# Patient Record
Sex: FEMALE | Race: WHITE | NOT HISPANIC OR LATINO | Employment: OTHER | URBAN - METROPOLITAN AREA
[De-identification: names, ages, dates, MRNs, and addresses within clinical notes are randomized per-mention and may not be internally consistent; named-entity substitution may affect disease eponyms.]

---

## 2023-05-25 LAB
BASOPHILS (10*3/UL) IN BLOOD BY AUTOMATED COUNT: 0.05 X10E9/L (ref 0–0.1)
BASOPHILS/100 LEUKOCYTES IN BLOOD BY AUTOMATED COUNT: 0.4 % (ref 0–2)
EOSINOPHILS (10*3/UL) IN BLOOD BY AUTOMATED COUNT: 0.36 X10E9/L (ref 0–0.7)
EOSINOPHILS/100 LEUKOCYTES IN BLOOD BY AUTOMATED COUNT: 3.2 % (ref 0–6)
ERYTHROCYTE DISTRIBUTION WIDTH (RATIO) BY AUTOMATED COUNT: 18.4 % (ref 11.5–14.5)
ERYTHROCYTE MEAN CORPUSCULAR HEMOGLOBIN CONCENTRATION (G/DL) BY AUTOMATED: 29 G/DL (ref 32–36)
ERYTHROCYTE MEAN CORPUSCULAR VOLUME (FL) BY AUTOMATED COUNT: 88 FL (ref 80–100)
ERYTHROCYTES (10*6/UL) IN BLOOD BY AUTOMATED COUNT: 3.39 X10E12/L (ref 4–5.2)
HEMATOCRIT (%) IN BLOOD BY AUTOMATED COUNT: 29.7 % (ref 36–46)
HEMOGLOBIN (G/DL) IN BLOOD: 8.6 G/DL (ref 12–16)
IMMATURE GRANULOCYTES/100 LEUKOCYTES IN BLOOD BY AUTOMATED COUNT: 0.7 % (ref 0–0.9)
LEUKOCYTES (10*3/UL) IN BLOOD BY AUTOMATED COUNT: 11.3 X10E9/L (ref 4.4–11.3)
LYMPHOCYTES (10*3/UL) IN BLOOD BY AUTOMATED COUNT: 1.49 X10E9/L (ref 1.2–4.8)
LYMPHOCYTES/100 LEUKOCYTES IN BLOOD BY AUTOMATED COUNT: 13.2 % (ref 13–44)
MONOCYTES (10*3/UL) IN BLOOD BY AUTOMATED COUNT: 0.88 X10E9/L (ref 0.1–1)
MONOCYTES/100 LEUKOCYTES IN BLOOD BY AUTOMATED COUNT: 7.8 % (ref 2–10)
NEUTROPHILS (10*3/UL) IN BLOOD BY AUTOMATED COUNT: 8.43 X10E9/L (ref 1.2–7.7)
NEUTROPHILS/100 LEUKOCYTES IN BLOOD BY AUTOMATED COUNT: 74.7 % (ref 40–80)
PLATELETS (10*3/UL) IN BLOOD AUTOMATED COUNT: 461 X10E9/L (ref 150–450)

## 2023-07-26 ENCOUNTER — TELEPHONE (OUTPATIENT)
Dept: PRIMARY CARE | Facility: CLINIC | Age: 68
End: 2023-07-26
Payer: MEDICARE

## 2023-07-26 NOTE — TELEPHONE ENCOUNTER
Pt is due for her mammogram. According to her chart, she has never had one.    Please let me know when this is ordered so I can call pt.

## 2023-08-12 NOTE — TELEPHONE ENCOUNTER
I have not seen her in > 1 year and she missed her last appointment at our office. Please call to schedule with me.

## 2023-09-01 PROBLEM — E86.0 DEHYDRATION: Status: ACTIVE | Noted: 2023-09-01

## 2023-09-01 PROBLEM — F33.41 RECURRENT MAJOR DEPRESSIVE DISORDER, IN PARTIAL REMISSION (CMS-HCC): Status: ACTIVE | Noted: 2022-06-28

## 2023-09-01 PROBLEM — G43.119 INTRACTABLE MIGRAINE WITH AURA WITHOUT STATUS MIGRAINOSUS: Status: ACTIVE | Noted: 2023-04-04

## 2023-09-01 PROBLEM — R06.02 SOB (SHORTNESS OF BREATH) ON EXERTION: Status: ACTIVE | Noted: 2023-07-17

## 2023-09-01 PROBLEM — R29.6 FREQUENT FALLS: Status: ACTIVE | Noted: 2023-09-01

## 2023-09-01 PROBLEM — I49.01 VENTRICULAR FIBRILLATION (MULTI): Status: ACTIVE | Noted: 2023-09-01

## 2023-09-01 PROBLEM — G47.33 OSA ON CPAP: Status: ACTIVE | Noted: 2023-09-01

## 2023-09-01 PROBLEM — I50.32 CHRONIC DIASTOLIC CONGESTIVE HEART FAILURE (MULTI): Status: ACTIVE | Noted: 2022-06-29

## 2023-09-01 PROBLEM — R10.10 PAIN OF UPPER ABDOMEN: Status: ACTIVE | Noted: 2023-09-01

## 2023-09-01 PROBLEM — F41.0 PANIC DISORDER WITHOUT AGORAPHOBIA: Status: ACTIVE | Noted: 2020-01-29

## 2023-09-01 PROBLEM — K21.9 GASTROESOPHAGEAL REFLUX DISEASE WITHOUT ESOPHAGITIS: Status: ACTIVE | Noted: 2022-06-28

## 2023-09-01 PROBLEM — R93.2 ABNORMAL LIVER ULTRASOUND: Status: ACTIVE | Noted: 2023-09-01

## 2023-09-01 PROBLEM — I25.10 ATHEROSCLEROSIS OF NATIVE CORONARY ARTERY OF NATIVE HEART WITHOUT ANGINA PECTORIS: Status: ACTIVE | Noted: 2023-09-01

## 2023-09-01 PROBLEM — Z95.0 PACEMAKER: Status: ACTIVE | Noted: 2023-09-01

## 2023-09-01 PROBLEM — E66.813 OBESITY, CLASS III, BMI 40-49.9 (MORBID OBESITY): Status: ACTIVE | Noted: 2023-03-27

## 2023-09-01 PROBLEM — R44.2: Status: ACTIVE | Noted: 2023-09-01

## 2023-09-01 PROBLEM — E78.2 HYPERLIPIDEMIA, MIXED: Status: ACTIVE | Noted: 2023-09-01

## 2023-09-01 PROBLEM — E78.5 DYSLIPIDEMIA: Status: ACTIVE | Noted: 2022-06-28

## 2023-09-01 PROBLEM — R11.0 NAUSEA IN ADULT: Status: ACTIVE | Noted: 2023-09-01

## 2023-09-01 PROBLEM — I50.9 CONGESTIVE HEART FAILURE OF UNKNOWN ETIOLOGY (MULTI): Status: ACTIVE | Noted: 2023-09-01

## 2023-09-01 PROBLEM — S92.355D CLOSED NONDISPLACED FRACTURE OF FIFTH METATARSAL BONE OF LEFT FOOT WITH ROUTINE HEALING: Status: ACTIVE | Noted: 2020-09-17

## 2023-09-01 PROBLEM — F31.81 BIPOLAR II DISORDER (MULTI): Status: ACTIVE | Noted: 2021-07-16

## 2023-09-01 PROBLEM — R11.2 NAUSEA VOMITING AND DIARRHEA: Status: ACTIVE | Noted: 2023-07-18

## 2023-09-01 PROBLEM — J96.11 CHRONIC RESPIRATORY FAILURE WITH HYPOXIA (MULTI): Status: ACTIVE | Noted: 2018-10-15

## 2023-09-01 PROBLEM — M54.9 BACK PAIN: Status: ACTIVE | Noted: 2023-09-01

## 2023-09-01 PROBLEM — E87.6 HYPOKALEMIA: Status: ACTIVE | Noted: 2022-10-05

## 2023-09-01 PROBLEM — D50.9 NORMOCYTIC HYPOCHROMIC ANEMIA: Status: ACTIVE | Noted: 2023-01-27

## 2023-09-01 PROBLEM — G62.9 NEUROPATHY: Status: ACTIVE | Noted: 2023-09-01

## 2023-09-01 PROBLEM — Z98.890 H/O CARDIAC CATHETERIZATION: Status: ACTIVE | Noted: 2023-09-01

## 2023-09-01 PROBLEM — R91.1 PULMONARY NODULE SEEN ON IMAGING STUDY: Status: ACTIVE | Noted: 2023-09-01

## 2023-09-01 PROBLEM — I47.10 PSVT (PAROXYSMAL SUPRAVENTRICULAR TACHYCARDIA) (CMS-HCC): Status: ACTIVE | Noted: 2023-09-01

## 2023-09-01 PROBLEM — Z95.810 CARDIAC DEFIBRILLATOR IN PLACE: Status: ACTIVE | Noted: 2023-09-01

## 2023-09-01 PROBLEM — E87.70 FLUID OVERLOAD: Status: ACTIVE | Noted: 2023-09-01

## 2023-09-01 PROBLEM — E66.01 OBESITY, CLASS III, BMI 40-49.9 (MORBID OBESITY) (MULTI): Status: ACTIVE | Noted: 2023-03-27

## 2023-09-01 PROBLEM — K22.70 BARRETT'S ESOPHAGUS WITHOUT DYSPLASIA: Status: ACTIVE | Noted: 2023-09-01

## 2023-09-01 PROBLEM — E66.9 CLASS 2 OBESITY WITH BODY MASS INDEX (BMI) OF 38.0 TO 38.9 IN ADULT: Status: ACTIVE | Noted: 2023-09-01

## 2023-09-01 PROBLEM — F33.1 MDD (MAJOR DEPRESSIVE DISORDER), RECURRENT EPISODE, MODERATE (MULTI): Status: ACTIVE | Noted: 2020-01-29

## 2023-09-01 PROBLEM — J44.9 STAGE 3 SEVERE COPD BY GOLD CLASSIFICATION (MULTI): Status: ACTIVE | Noted: 2018-10-15

## 2023-09-01 PROBLEM — G25.81 RESTLESS LEGS: Status: ACTIVE | Noted: 2023-09-01

## 2023-09-01 PROBLEM — R55 NEAR SYNCOPE: Status: ACTIVE | Noted: 2023-09-01

## 2023-09-01 PROBLEM — E66.9 CLASS 1 OBESITY: Status: ACTIVE | Noted: 2020-09-10

## 2023-09-01 PROBLEM — E87.1 HYPONATREMIA: Status: ACTIVE | Noted: 2023-09-01

## 2023-09-01 PROBLEM — K43.2 INCISIONAL HERNIA: Status: ACTIVE | Noted: 2023-09-01

## 2023-09-01 PROBLEM — R51.9 HEADACHE: Status: ACTIVE | Noted: 2023-09-01

## 2023-09-01 PROBLEM — E66.9 OBESITY (BMI 30.0-34.9): Status: ACTIVE | Noted: 2023-09-01

## 2023-09-01 PROBLEM — R42 DIZZINESS: Status: ACTIVE | Noted: 2023-09-01

## 2023-09-01 PROBLEM — E66.811 CLASS 1 OBESITY: Status: ACTIVE | Noted: 2020-09-10

## 2023-09-01 PROBLEM — K56.51 INTESTINAL ADHESIONS WITH PARTIAL OBSTRUCTION (MULTI): Status: ACTIVE | Noted: 2023-09-01

## 2023-09-01 PROBLEM — E66.812 CLASS 2 OBESITY WITH BODY MASS INDEX (BMI) OF 36.0 TO 36.9 IN ADULT: Status: ACTIVE | Noted: 2023-09-01

## 2023-09-01 PROBLEM — I48.0 PAROXYSMAL ATRIAL FIBRILLATION WITH RVR (MULTI): Status: ACTIVE | Noted: 2023-09-01

## 2023-09-01 PROBLEM — J18.9 PNEUMONIA DUE TO INFECTIOUS ORGANISM: Status: ACTIVE | Noted: 2022-10-15

## 2023-09-01 PROBLEM — R53.83 FATIGUE: Status: ACTIVE | Noted: 2023-09-01

## 2023-09-01 PROBLEM — R29.818 SUSPECTED SLEEP APNEA: Status: ACTIVE | Noted: 2023-09-01

## 2023-09-01 PROBLEM — R10.11 COLICKY RUQ ABDOMINAL PAIN: Status: ACTIVE | Noted: 2023-09-01

## 2023-09-01 PROBLEM — J45.909 INTRINSIC ASTHMA (HHS-HCC): Status: ACTIVE | Noted: 2023-09-01

## 2023-09-01 PROBLEM — E66.812 CLASS 2 OBESITY WITH BODY MASS INDEX (BMI) OF 38.0 TO 38.9 IN ADULT: Status: ACTIVE | Noted: 2023-09-01

## 2023-09-01 PROBLEM — K59.09 CHRONIC CONSTIPATION: Status: ACTIVE | Noted: 2022-06-28

## 2023-09-01 PROBLEM — I47.29 PAROXYSMAL VENTRICULAR TACHYCARDIA (MULTI): Status: ACTIVE | Noted: 2023-09-01

## 2023-09-01 PROBLEM — I95.1 ORTHOSTATIC HYPOTENSION: Status: ACTIVE | Noted: 2023-09-01

## 2023-09-01 PROBLEM — A49.8 KLEBSIELLA INFECTION: Status: ACTIVE | Noted: 2023-09-01

## 2023-09-01 PROBLEM — I47.20 PAROXYSMAL VENTRICULAR TACHYCARDIA: Status: ACTIVE | Noted: 2023-09-01

## 2023-09-01 PROBLEM — S62.308A: Status: ACTIVE | Noted: 2023-09-01

## 2023-09-01 PROBLEM — E66.811 OBESITY (BMI 30.0-34.9): Status: ACTIVE | Noted: 2023-09-01

## 2023-09-01 PROBLEM — J44.1 ACUTE EXACERBATION OF CHRONIC OBSTRUCTIVE PULMONARY DISEASE (MULTI): Status: ACTIVE | Noted: 2022-04-20

## 2023-09-01 PROBLEM — M19.90 DJD (DEGENERATIVE JOINT DISEASE): Status: ACTIVE | Noted: 2023-09-01

## 2023-09-01 PROBLEM — R19.7 NAUSEA VOMITING AND DIARRHEA: Status: ACTIVE | Noted: 2023-07-18

## 2023-09-01 PROBLEM — E66.9 CLASS 2 OBESITY WITH BODY MASS INDEX (BMI) OF 36.0 TO 36.9 IN ADULT: Status: ACTIVE | Noted: 2023-09-01

## 2023-09-01 PROBLEM — K56.609 SMALL BOWEL OBSTRUCTION (MULTI): Status: ACTIVE | Noted: 2023-09-01

## 2023-09-01 PROBLEM — Z79.899 LONG TERM USE OF DRUG: Status: ACTIVE | Noted: 2023-09-01

## 2023-09-01 RX ORDER — LEVOTHYROXINE SODIUM 112 UG/1
112 TABLET ORAL
COMMUNITY
Start: 2018-03-28

## 2023-09-01 RX ORDER — VALSARTAN 160 MG/1
160 TABLET ORAL DAILY
COMMUNITY
End: 2023-11-10 | Stop reason: ALTCHOICE

## 2023-09-01 RX ORDER — METOPROLOL SUCCINATE 25 MG/1
25 TABLET, EXTENDED RELEASE ORAL EVERY EVENING
COMMUNITY
Start: 2022-08-15 | End: 2023-11-10 | Stop reason: SDUPTHER

## 2023-09-01 RX ORDER — ALBUTEROL SULFATE 0.83 MG/ML
2.5 SOLUTION RESPIRATORY (INHALATION) EVERY 4 HOURS PRN
COMMUNITY
Start: 2013-02-06

## 2023-09-01 RX ORDER — FLUOXETINE HYDROCHLORIDE 20 MG/1
40 CAPSULE ORAL DAILY
COMMUNITY
Start: 2014-02-13 | End: 2023-11-10 | Stop reason: ALTCHOICE

## 2023-09-01 RX ORDER — ASPIRIN 81 MG/1
81 TABLET ORAL DAILY
COMMUNITY
End: 2023-11-10 | Stop reason: ALTCHOICE

## 2023-09-01 RX ORDER — TIOTROPIUM BROMIDE 18 UG/1
18 CAPSULE ORAL; RESPIRATORY (INHALATION) DAILY
COMMUNITY
Start: 2013-11-18 | End: 2023-11-10 | Stop reason: ALTCHOICE

## 2023-09-01 RX ORDER — MONTELUKAST SODIUM 10 MG/1
10 TABLET ORAL NIGHTLY
COMMUNITY
End: 2023-11-10 | Stop reason: ALTCHOICE

## 2023-09-01 RX ORDER — FERROUS SULFATE 325(65) MG
325 TABLET ORAL DAILY
COMMUNITY
Start: 2016-09-12 | End: 2023-11-10 | Stop reason: ALTCHOICE

## 2023-09-01 RX ORDER — QUETIAPINE FUMARATE 50 MG/1
50 TABLET, EXTENDED RELEASE ORAL NIGHTLY
COMMUNITY

## 2023-09-01 RX ORDER — AMLODIPINE BESYLATE 5 MG/1
1 TABLET ORAL DAILY
COMMUNITY
Start: 2018-07-20 | End: 2023-11-10 | Stop reason: ALTCHOICE

## 2023-09-01 RX ORDER — LAMOTRIGINE 100 MG/1
1 TABLET ORAL DAILY
COMMUNITY

## 2023-09-01 RX ORDER — LOSARTAN POTASSIUM 50 MG/1
50 TABLET ORAL DAILY
COMMUNITY
End: 2023-11-10 | Stop reason: ALTCHOICE

## 2023-09-01 RX ORDER — AZITHROMYCIN 500 MG/1
500 TABLET, FILM COATED ORAL DAILY
COMMUNITY
Start: 2023-03-30 | End: 2023-11-10 | Stop reason: ALTCHOICE

## 2023-09-01 RX ORDER — METHYLPREDNISOLONE 4 MG/1
TABLET ORAL
COMMUNITY
Start: 2023-07-23 | End: 2023-11-10 | Stop reason: ALTCHOICE

## 2023-09-01 RX ORDER — CLOTRIMAZOLE 10 MG/1
10 LOZENGE ORAL; TOPICAL 3 TIMES DAILY
COMMUNITY
Start: 2016-08-12

## 2023-09-01 RX ORDER — IPRATROPIUM BROMIDE 0.5 MG/2.5ML
0.5 SOLUTION RESPIRATORY (INHALATION)
COMMUNITY
Start: 2017-02-02 | End: 2023-11-10 | Stop reason: ALTCHOICE

## 2023-09-01 RX ORDER — MIRTAZAPINE 15 MG/1
1 TABLET, FILM COATED ORAL NIGHTLY
COMMUNITY
Start: 2022-07-08

## 2023-09-01 RX ORDER — IPRATROPIUM BROMIDE AND ALBUTEROL SULFATE 2.5; .5 MG/3ML; MG/3ML
3 SOLUTION RESPIRATORY (INHALATION) EVERY 4 HOURS
COMMUNITY
Start: 2014-03-19 | End: 2023-11-10 | Stop reason: ALTCHOICE

## 2023-09-01 RX ORDER — IPRATROPIUM BROMIDE 17 UG/1
2 AEROSOL, METERED RESPIRATORY (INHALATION) 2 TIMES DAILY
COMMUNITY
Start: 2023-05-12

## 2023-09-01 RX ORDER — ONDANSETRON 4 MG/1
4 TABLET, FILM COATED ORAL 2 TIMES DAILY PRN
COMMUNITY
Start: 2021-12-14

## 2023-09-01 RX ORDER — AMITRIPTYLINE HYDROCHLORIDE 50 MG/1
2 TABLET, FILM COATED ORAL NIGHTLY
COMMUNITY
Start: 2017-11-15

## 2023-09-01 RX ORDER — ERGOCALCIFEROL 1.25 MG/1
1 CAPSULE ORAL WEEKLY
COMMUNITY
Start: 2016-09-14 | End: 2023-11-10 | Stop reason: ALTCHOICE

## 2023-09-01 RX ORDER — GUAIFENESIN 600 MG/1
600 TABLET, EXTENDED RELEASE ORAL EVERY 12 HOURS
COMMUNITY
Start: 2023-03-29

## 2023-09-01 RX ORDER — ALPRAZOLAM 0.25 MG/1
0.25 TABLET ORAL 3 TIMES DAILY PRN
COMMUNITY
Start: 2022-06-29

## 2023-09-01 RX ORDER — LISINOPRIL 20 MG/1
20 TABLET ORAL DAILY
COMMUNITY
Start: 2013-11-18 | End: 2023-11-10 | Stop reason: ALTCHOICE

## 2023-09-01 RX ORDER — LEVOTHYROXINE SODIUM 125 UG/1
1 TABLET ORAL
COMMUNITY
Start: 2014-06-27

## 2023-09-01 RX ORDER — DOCUSATE SODIUM 100 MG/1
100 CAPSULE, LIQUID FILLED ORAL 2 TIMES DAILY
COMMUNITY
Start: 2023-02-04

## 2023-09-01 RX ORDER — NYSTATIN 100000 [USP'U]/ML
5 SUSPENSION ORAL 4 TIMES DAILY
COMMUNITY
Start: 2022-10-26 | End: 2023-11-10 | Stop reason: ALTCHOICE

## 2023-09-01 RX ORDER — LIDOCAINE 50 MG/G
1 PATCH TOPICAL DAILY
COMMUNITY
Start: 2013-11-11 | End: 2023-11-10 | Stop reason: ALTCHOICE

## 2023-09-01 RX ORDER — FUROSEMIDE 20 MG/1
20 TABLET ORAL DAILY PRN
COMMUNITY
Start: 2018-10-04 | End: 2023-11-10 | Stop reason: ALTCHOICE

## 2023-09-01 RX ORDER — ACETAMINOPHEN 500 MG
500 TABLET ORAL EVERY 8 HOURS PRN
COMMUNITY

## 2023-09-01 RX ORDER — SULFAMETHOXAZOLE AND TRIMETHOPRIM 800; 160 MG/1; MG/1
1 TABLET ORAL 2 TIMES DAILY
COMMUNITY
Start: 2023-06-16 | End: 2023-11-10 | Stop reason: ALTCHOICE

## 2023-09-01 RX ORDER — PREDNISONE 20 MG/1
TABLET ORAL
COMMUNITY
End: 2023-11-10 | Stop reason: ALTCHOICE

## 2023-09-01 RX ORDER — ATORVASTATIN CALCIUM 40 MG/1
1 TABLET, FILM COATED ORAL NIGHTLY
COMMUNITY
Start: 2017-05-17

## 2023-09-01 RX ORDER — TRAZODONE HYDROCHLORIDE 100 MG/1
100 TABLET ORAL DAILY PRN
COMMUNITY
Start: 2023-06-12

## 2023-09-01 RX ORDER — FLUTICASONE PROPIONATE AND SALMETEROL 500; 50 UG/1; UG/1
1 POWDER RESPIRATORY (INHALATION) 2 TIMES DAILY
COMMUNITY
Start: 2013-11-18

## 2023-09-01 RX ORDER — VENLAFAXINE HYDROCHLORIDE 150 MG/1
150 CAPSULE, EXTENDED RELEASE ORAL DAILY
COMMUNITY
Start: 2021-09-13

## 2023-09-01 RX ORDER — METOPROLOL SUCCINATE 100 MG/1
100 TABLET, EXTENDED RELEASE ORAL DAILY
COMMUNITY
End: 2023-11-10 | Stop reason: SDUPTHER

## 2023-09-01 RX ORDER — FLUDROCORTISONE ACETATE 0.1 MG/1
0.1 TABLET ORAL DAILY
COMMUNITY
Start: 2023-06-19

## 2023-09-01 RX ORDER — QUETIAPINE 200 MG/1
200 TABLET, FILM COATED, EXTENDED RELEASE ORAL NIGHTLY
COMMUNITY
Start: 2022-07-25

## 2023-09-01 RX ORDER — ACETAMINOPHEN 500 MG
1000 TABLET ORAL 3 TIMES DAILY
COMMUNITY
Start: 2013-10-21 | End: 2023-11-10 | Stop reason: ALTCHOICE

## 2023-09-01 RX ORDER — ALBUTEROL SULFATE 90 UG/1
2 AEROSOL, METERED RESPIRATORY (INHALATION) EVERY 4 HOURS PRN
COMMUNITY
Start: 2013-01-25

## 2023-09-01 RX ORDER — DOXAZOSIN 8 MG/1
1 TABLET ORAL NIGHTLY
COMMUNITY
Start: 2018-03-14

## 2023-09-01 RX ORDER — CYCLOBENZAPRINE HCL 10 MG
5 TABLET ORAL 3 TIMES DAILY PRN
COMMUNITY
Start: 2013-11-18 | End: 2023-11-10 | Stop reason: ALTCHOICE

## 2023-09-01 RX ORDER — DIGOXIN 125 MCG
1 TABLET ORAL EVERY OTHER DAY
COMMUNITY
Start: 2022-08-05

## 2023-09-01 RX ORDER — POLYETHYLENE GLYCOL 3350 17 G/17G
17 POWDER, FOR SOLUTION ORAL DAILY
COMMUNITY
Start: 2022-12-07 | End: 2023-11-10 | Stop reason: ALTCHOICE

## 2023-09-01 RX ORDER — POTASSIUM CHLORIDE 20 MEQ/1
20 TABLET, EXTENDED RELEASE ORAL DAILY
COMMUNITY
Start: 2023-08-10

## 2023-09-01 RX ORDER — FUROSEMIDE 40 MG/1
40 TABLET ORAL DAILY PRN
COMMUNITY
Start: 2018-10-04

## 2023-09-01 RX ORDER — FERROUS SULFATE 325(65) MG
325 TABLET, DELAYED RELEASE (ENTERIC COATED) ORAL 2 TIMES DAILY
COMMUNITY
Start: 2014-03-19 | End: 2023-11-10 | Stop reason: ALTCHOICE

## 2023-09-01 RX ORDER — TIZANIDINE 4 MG/1
1 TABLET ORAL EVERY 8 HOURS PRN
COMMUNITY
Start: 2017-10-19 | End: 2023-11-10 | Stop reason: ALTCHOICE

## 2023-09-01 RX ORDER — BENRALIZUMAB 30 MG/ML
INJECTION, SOLUTION SUBCUTANEOUS
COMMUNITY
Start: 2023-07-19

## 2023-09-01 RX ORDER — SUMATRIPTAN 50 MG/1
1 TABLET, FILM COATED ORAL AS NEEDED
COMMUNITY
Start: 2023-01-10

## 2023-09-01 RX ORDER — IRBESARTAN 300 MG/1
300 TABLET ORAL DAILY
COMMUNITY
Start: 2018-03-28

## 2023-09-01 RX ORDER — OMEPRAZOLE 40 MG/1
40 CAPSULE, DELAYED RELEASE ORAL
COMMUNITY
Start: 2013-11-11 | End: 2023-11-10 | Stop reason: ALTCHOICE

## 2023-09-01 RX ORDER — GABAPENTIN 300 MG/1
300 CAPSULE ORAL 3 TIMES DAILY
COMMUNITY
Start: 2014-03-19 | End: 2023-11-10 | Stop reason: ALTCHOICE

## 2023-09-01 RX ORDER — PANTOPRAZOLE SODIUM 40 MG/1
40 TABLET, DELAYED RELEASE ORAL EVERY MORNING
COMMUNITY

## 2023-09-01 RX ORDER — LORATADINE 10 MG/1
10 TABLET ORAL DAILY PRN
COMMUNITY
Start: 2013-11-18 | End: 2023-11-10 | Stop reason: ALTCHOICE

## 2023-09-01 RX ORDER — AMLODIPINE BESYLATE 10 MG/1
10 TABLET ORAL DAILY
COMMUNITY
Start: 2023-03-30 | End: 2023-11-10 | Stop reason: SDUPTHER

## 2023-09-01 RX ORDER — APIXABAN 2.5 MG/1
2.5 TABLET, FILM COATED ORAL 2 TIMES DAILY
COMMUNITY
Start: 2022-08-03 | End: 2023-11-10 | Stop reason: ALTCHOICE

## 2023-09-01 RX ORDER — FLUTICASONE FUROATE AND VILANTEROL TRIFENATATE 200; 25 UG/1; UG/1
1 POWDER RESPIRATORY (INHALATION) DAILY
COMMUNITY

## 2023-09-01 RX ORDER — HYDROXYZINE PAMOATE 25 MG/1
25 CAPSULE ORAL 3 TIMES DAILY PRN
COMMUNITY
Start: 2017-10-19 | End: 2023-11-10 | Stop reason: ALTCHOICE

## 2023-09-01 RX ORDER — TIZANIDINE 4 MG/1
1-2 TABLET ORAL 3 TIMES DAILY PRN
COMMUNITY
Start: 2018-04-04 | End: 2023-11-10 | Stop reason: ALTCHOICE

## 2023-09-01 RX ORDER — QUETIAPINE 150 MG/1
150 TABLET, FILM COATED, EXTENDED RELEASE ORAL EVERY EVENING
COMMUNITY

## 2023-09-01 RX ORDER — LAMOTRIGINE 25 MG/1
25 TABLET ORAL 2 TIMES DAILY
COMMUNITY
Start: 2021-06-15

## 2023-09-01 RX ORDER — FOLIC ACID-PYRIDOXINE-CYANOCOBALAMIN TAB 2.5-25-2 MG 2.5-25-2 MG
1 TAB ORAL DAILY
COMMUNITY
Start: 2014-04-18 | End: 2023-11-10 | Stop reason: ALTCHOICE

## 2023-10-09 ENCOUNTER — APPOINTMENT (OUTPATIENT)
Dept: CARDIOLOGY | Facility: CLINIC | Age: 68
End: 2023-10-09
Payer: MEDICARE

## 2023-11-10 ENCOUNTER — HOSPITAL ENCOUNTER (OUTPATIENT)
Dept: CARDIOLOGY | Facility: HOSPITAL | Age: 68
Discharge: HOME | End: 2023-11-10
Payer: MEDICARE

## 2023-11-10 ENCOUNTER — OFFICE VISIT (OUTPATIENT)
Dept: CARDIOLOGY | Facility: CLINIC | Age: 68
End: 2023-11-10
Payer: MEDICARE

## 2023-11-10 VITALS — SYSTOLIC BLOOD PRESSURE: 130 MMHG | DIASTOLIC BLOOD PRESSURE: 68 MMHG | HEART RATE: 60 BPM

## 2023-11-10 DIAGNOSIS — E66.9 CLASS 2 OBESITY WITHOUT SERIOUS COMORBIDITY WITH BODY MASS INDEX (BMI) OF 36.0 TO 36.9 IN ADULT, UNSPECIFIED OBESITY TYPE: ICD-10-CM

## 2023-11-10 DIAGNOSIS — Z95.810 PRESENCE OF AUTOMATIC (IMPLANTABLE) CARDIAC DEFIBRILLATOR: ICD-10-CM

## 2023-11-10 DIAGNOSIS — Z87.891 FORMER SMOKER: ICD-10-CM

## 2023-11-10 DIAGNOSIS — Z71.89 ENCOUNTER FOR MEDICATION REVIEW AND COUNSELING: ICD-10-CM

## 2023-11-10 DIAGNOSIS — I10 ESSENTIAL HYPERTENSION, BENIGN: Primary | Chronic | ICD-10-CM

## 2023-11-10 DIAGNOSIS — Z95.0 PACEMAKER: ICD-10-CM

## 2023-11-10 DIAGNOSIS — Z71.2 ENCOUNTER TO DISCUSS TEST RESULTS: ICD-10-CM

## 2023-11-10 DIAGNOSIS — I48.0 PAROXYSMAL ATRIAL FIBRILLATION WITH RVR (MULTI): ICD-10-CM

## 2023-11-10 DIAGNOSIS — E78.2 HYPERLIPIDEMIA, MIXED: ICD-10-CM

## 2023-11-10 PROCEDURE — 3075F SYST BP GE 130 - 139MM HG: CPT | Performed by: INTERNAL MEDICINE

## 2023-11-10 PROCEDURE — 1125F AMNT PAIN NOTED PAIN PRSNT: CPT | Performed by: INTERNAL MEDICINE

## 2023-11-10 PROCEDURE — 93000 ELECTROCARDIOGRAM COMPLETE: CPT | Performed by: INTERNAL MEDICINE

## 2023-11-10 PROCEDURE — 93290 INTERROG DEV EVAL ICPMS IP: CPT | Performed by: INTERNAL MEDICINE

## 2023-11-10 PROCEDURE — 1159F MED LIST DOCD IN RCRD: CPT | Performed by: INTERNAL MEDICINE

## 2023-11-10 PROCEDURE — 3008F BODY MASS INDEX DOCD: CPT | Performed by: INTERNAL MEDICINE

## 2023-11-10 PROCEDURE — 99215 OFFICE O/P EST HI 40 MIN: CPT | Performed by: INTERNAL MEDICINE

## 2023-11-10 PROCEDURE — 1036F TOBACCO NON-USER: CPT | Performed by: INTERNAL MEDICINE

## 2023-11-10 PROCEDURE — 93282 PRGRMG EVAL IMPLANTABLE DFB: CPT

## 2023-11-10 PROCEDURE — 93282 PRGRMG EVAL IMPLANTABLE DFB: CPT | Performed by: INTERNAL MEDICINE

## 2023-11-10 PROCEDURE — 3078F DIAST BP <80 MM HG: CPT | Performed by: INTERNAL MEDICINE

## 2023-11-10 RX ORDER — METOPROLOL SUCCINATE 100 MG/1
100 TABLET, EXTENDED RELEASE ORAL DAILY
Qty: 90 TABLET | Refills: 3 | Status: SHIPPED | OUTPATIENT
Start: 2023-11-10

## 2023-11-10 RX ORDER — AMLODIPINE BESYLATE 10 MG/1
10 TABLET ORAL DAILY
Qty: 90 TABLET | Refills: 3 | Status: SHIPPED | OUTPATIENT
Start: 2023-11-10

## 2023-11-10 RX ORDER — METOPROLOL SUCCINATE 25 MG/1
25 TABLET, EXTENDED RELEASE ORAL EVERY EVENING
Qty: 90 TABLET | Refills: 3 | Status: SHIPPED | OUTPATIENT
Start: 2023-11-10

## 2023-11-10 NOTE — PROGRESS NOTES
Chief Complaint:   Follow-up (With device check.  Would like to discuss if she needs an ablation)     History Of Present Illness:    Mercedez Dunham is a 68 y.o. female presenting with follow-up..  She has been compliant with her medications.  Since then, she has had no further tachycardia.  I discussed with her daughter and her need for compliance with medications.  At this time would hold off on any AV cody ablation.  If recurrent tachycardia then would pursue AV cody ablation.  Last Recorded Vitals:  Vitals:    11/10/23 0808   BP: 130/68   Pulse: 60         Past Medical History:  She has a past medical history of Alcohol dependence, in remission (CMS/[facility]) (09/22/2020), Encounter for immunization (09/22/2020), Other psychoactive substance abuse, in remission (CMS/[facility]) (09/22/2020), Other specified counseling, Personal history of other diseases of the digestive system, Personal history of other diseases of the digestive system (08/14/2020), Personal history of other infectious and parasitic diseases (08/14/2020), Personal history of other medical treatment (02/02/2021), and Personal history of pulmonary embolism (04/12/2019).    Past Surgical History:  She has a past surgical history that includes Other surgical history (08/13/2018); Abdominal adhesion surgery (08/13/2018); Hysterectomy (08/13/2018); Other surgical history (01/10/2022); Other surgical history (01/10/2022); Knee surgery (01/10/2022); Small intestine surgery (01/10/2022); Other surgical history (12/12/2018); Other surgical history (12/12/2018); Other surgical history (12/12/2018); and Other surgical history (01/16/2019).      Social History:  She reports that she has quit smoking. Her smoking use included cigarettes. She has never used smokeless tobacco. She reports that she does not drink alcohol and does not use drugs.    Family History:  Family History   Problem Relation Name Age of Onset    Cancer Mother      Breast cancer Mother      Other  (cardiac disorder) Father      Cancer Father      Stomach cancer Father      Heart attack Father      Other (cardiac pacemaker) Sister      Diabetes Sister      Stroke Maternal Cousin      Heart failure Maternal Cousin      Stomach cancer Maternal Cousin      Heart attack Maternal Cousin          Allergies:  Carbidopa, Levodopa, Sertraline, Umeclidinium, Amoxicillin, Levorphanol, Lisinopril, and Penicillins    Outpatient Medications:  Current Outpatient Medications   Medication Instructions    acetaminophen (TYLENOL) 500 mg, oral, Every 8 hours PRN,  for pain.    albuterol 90 mcg/actuation inhaler 2 puffs, inhalation, Every 4 hours PRN    albuterol 2.5 mg, inhalation, Every 4 hours PRN    ALPRAZolam (XANAX) 0.25 mg, oral, 3 times daily PRN    amitriptyline (Elavil) 50 mg tablet 2 tablets, oral, Nightly    amLODIPine (NORVASC) 10 mg, oral, Daily    atorvastatin (Lipitor) 40 mg tablet 1 tablet, oral, Nightly    Breo Ellipta 200-25 mcg/dose inhaler 1 puff, inhalation, Daily    clotrimazole (MYCELEX) 10 mg, oral, 3 times daily, Let dissolve in the mouth gradually    digoxin (Lanoxin) 125 MCG tablet 1 tablet, oral, Every other day    docusate sodium (COLACE) 100 mg, oral, 2 times daily    doxazosin (Cardura) 8 mg tablet 1 tablet, oral, Nightly    Fasenra Pen 30 mg/mL injection subcutaneous    fludrocortisone (FLORINEF) 0.1 mg, oral, Daily    fluticasone propion-salmeteroL (Advair Diskus) 500-50 mcg/dose diskus inhaler 1 puff, inhalation, 2 times daily    furosemide (LASIX) 40 mg, oral, Daily PRN,  for weight gain > 1 lb    guaiFENesin (MUCINEX) 600 mg, oral, Every 12 hours    ipratropium (Atrovent HFA) 17 mcg/actuation inhaler 2 puffs, inhalation, 2 times daily    irbesartan (AVAPRO) 300 mg, oral, Daily    lamoTRIgine (LaMICtal) 100 mg tablet 1 tablet, oral, Daily    lamoTRIgine (LAMICTAL) 25 mg, oral, 2 times daily    levothyroxine (Synthroid, Levoxyl) 125 mcg tablet 1 tablet, oral, Daily before breakfast     levothyroxine (SYNTHROID, LEVOXYL) 112 mcg, oral, Daily before breakfast, (30 minutes before breakfast)    metoprolol succinate XL (TOPROL-XL) 100 mg, oral, Daily, In addition to 25 mg - total 125 mg    metoprolol succinate XL (TOPROL-XL) 25 mg, oral, Every evening, IN ADDITION  MG DAILY    mirtazapine (Remeron) 15 mg tablet 1 tablet, oral, Nightly    multivitamin,tx-iron-minerals tablet 1 each, oral, Daily    ondansetron (ZOFRAN) 4 mg, oral, 2 times daily PRN    pantoprazole (PROTONIX) 40 mg, oral, Every morning, AT 6 AM    potassium chloride CR 20 mEq ER tablet 20 mEq, oral, Daily    QUEtiapine XR (SEROQUEL XR) 200 mg, oral, Nightly, Take a total of 250mg at bed time ( 200mg + 50mg tabs)<BR>    QUEtiapine XR (SEROQUEL XR) 50 mg, oral, Nightly, Take a total of 250mg at bed time ( 200mg + 50mg tabs)<BR>    QUEtiapine XR (SEROQUEL XR) 150 mg, oral, Every evening    SUMAtriptan (Imitrex) 50 mg tablet 1 tablet, oral, As needed    traZODone (DESYREL) 100 mg, oral, Daily PRN    venlafaxine XR (EFFEXOR-XR) 150 mg, oral, Daily   ROS    Constitutional: as noted in HPI.   Cardiovascular: as noted in HPI.   Respiratory: as noted in HPI.   Neurological: as noted in HPI.   All other systems have been reviewed and are negative for complaint.      Physical Exam:  Constitutional:  obese.   Eyes: no erythema, swelling or discharge from the eye .   Neck: neck is supple, symmetric, trachea midline, no masses  and no thyromegaly .   Pulmonary: no increased work of breathing or signs of respiratory distress  and lungs clear to auscultation.    Cardiovascular: carotid pulses 2+ bilaterally with no bruit , JVP was normal, no thrills , regular rhythm, normal S1 and S2, no murmurs , pedal pulses 2+ bilaterally  and no edema .   Abdomen: abdomen non-tender, no masses  and no hepatomegaly .   Skin: skin warm and dry, normal skin turgor .   Psychiatric judgment and insight is normal  and oriented to person, place and time .      Last  Labs:  CBC -  Lab Results   Component Value Date    WBC 11.3 05/25/2023    HGB 8.6 (L) 05/25/2023    HCT 29.7 (L) 05/25/2023    MCV 88 05/25/2023     (H) 05/25/2023       CMP -  Lab Results   Component Value Date    CALCIUM 8.2 (L) 01/25/2022    PHOS 3.1 01/21/2022    PROT 6.7 01/19/2022    ALBUMIN 4.2 01/19/2022    AST 17 01/19/2022    ALT 12 01/19/2022    ALKPHOS 66 01/19/2022    BILITOT 0.4 01/19/2022       LIPID PANEL -   Lab Results   Component Value Date    CHOL 188 06/03/2021    TRIG 161 (H) 06/03/2021    HDL 48.1 06/03/2021    CHHDL 3.9 06/03/2021    LDLF 108 (H) 06/03/2021    VLDL 32 06/03/2021       RENAL FUNCTION PANEL -   Lab Results   Component Value Date    GLUCOSE 88 01/25/2022     01/25/2022    K 3.5 01/25/2022     01/25/2022    CO2 28 01/25/2022    ANIONGAP 11 01/25/2022    BUN 3 (L) 01/25/2022    CREATININE 0.42 (L) 01/25/2022    CALCIUM 8.2 (L) 01/25/2022    PHOS 3.1 01/21/2022    ALBUMIN 4.2 01/19/2022        Lab Results   Component Value Date    BNP 50 11/03/2021    HGBA1C 5.6 07/18/2023       Last Cardiology Tests:  ECG:  Today. Appropriate pacing. LAD. Qtc 460 ms.      Device check today. 2.5 years device longevity. 21% V pacing. 0 VtT 0 SVT    Device Check: Aug 2023  [employer] [id number] ICD. 9 % ventricular pacing. Estimate longevity device over 2 years. 0 VT.          Lab review: I have personally reviewed the laboratory result(s) see above      Assessment/Plan   Diagnoses and all orders for this visit:  Essential hypertension, benign  Paroxysmal atrial fibrillation with RVR (CMS/HCC)  Pacemaker  Hyperlipidemia, mixed  Encounter for medication review and counseling  Encounter to discuss test results  Class 2 obesity without serious comorbidity with body mass index (BMI) of 36.0 to 36.9 in adult, unspecified obesity type  Former smoker      Paroxysmal atrial fibrillation with episodes of rapid ventricular rate.  Improved heart rate control with compliance of medication.   For now, no AV node ablation.  Recurrent atrial fibrillation with rapid ventricular rate and plan for his ablation and upgrade to CRT device.  Brochures for ablation and CRT device were reviewed previously and given to patient and daughter. Shared decision making performed. [place] decision tool.  Ventricular tachycardia. Ventricular fibrillation. Secondary prevention ICD with appropriate ATP and shocks for VT. Chronic. Stable. Reviewed meds. Continue metoprolol. Continues off amiodarone (due to chronic shortness of breath, COPD, oxygen dependence, and pt's concern about pulmonary side effects) with no recent episodoe of VT since last office visit in August 2022. Of note, pt voluntarily withdrew driving privileges after we discussed that she had syncope and multiple shocks.   Syncope; positive EPS Aug 2018 with normal SA, AV, and His-Purkinje conduction with inducible ventricular tachycardia status post single-chamber ICD implant. Chronic stable. No recurrence of syncope after pacing and ICD.  [employer] Ellipse VR . Chronic. Stable. Reviewed device check. Normal device function. Ordered device checks. Continue device check paired with EP OV. Of note, multiple adjustments with device have been performed over time. Pt had trial with increasing heart rate with pacing, which did not improve fatigue. Walk test performed in past, and appropriate increase in heart rate occurred natively. She does not have pacemaker syndrome. No indication for atrial lead at this time. Continue same parameters. If need his ablation future then would upgrade device as noted above.  Paroxysmal atrial fibrillation with rapid ventricular rate; contradicting histories on whether pt may have had TIA. No ECG documentation for review. Recurrent Gi bleed. Negative endoscopies and no GI intervention.was able to be performed as no bleeding site was identified. Initially anticoagulation was not administered in hospital due to anemia and GI bleed. I  referred patient to Dr. Flores for evaluation for watchman in the past. Patient did not show up for appointment. Defer to PCP for further evaluation if indicated. Reviewed medications. Refills.  PSVT. No inducible SVT at prior EP testing. Chronic. Stable. No clinical recurrence. No other intervention indicated presently. Reviewed meds. Continue empirical metoprolol. Sent refills.  Coronary artery disease. Chronic. Stable. Cath 2019 with chronic occlusion, and no acute lesion to account for VT shocks in past. Reviewed last catheterization. Reviewed medications. Continue medications. Refills discussed. Patient opts for conservative care. Follows with cardiology.  Anxiety about historical shocks for VT. Currently less anxious as she has not had any recent shocks or VT episodes. Continue to avoid meds that prolonged QT.   Multiple falls including hand trauma. Orthostatic hypotension. Chronic, stable. Reviewed meds. Continue medications. Discussed refills.  Hypertension, labile. Benign chronic controlled. Reviewed meds. Continue metoprolol and amlodipine. Discussed refills.  History of pulmonary embolism; previously treated with Xarelto chronic. Stable.  Hyperlipidemia; chronic. Stable. Reviewed meds. On statin. Discussed potential adverse effects of occasions.. Follows with cardiology and PCP for bloodwork.  Hypothyroidism. Chronic. Stable. Follows with PCP   COPD. On oxygen 4 L per nasal cannula. Chronic. Stable. Reviewed meds. Tolerates metoprolol. Completing prednisone course. Request pulmonary for clearance for possible his ablation and upgrade of device, given need for moderate sedation.  Declined Covid 19 vaccinations in past   History of left fifth finger fracture from fall. Chronic. Stable.   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 Stable. Reviewed meds.   Degenerative joint disease.Chronic. Stable. Reviewed meds.  Overweight.        AHA recommendations for exercise, diet, and  behavioral modification reviewed with pt.    The patient and I and daughter discussed the mechanism of arrhythmia, a fib, what is indication for AV cody ablation, types of devices, ventricular tachycardia, defibrillator, ECG, anticoagulation, His ablation, possible upgrade of device in future, compliance with medications, previous device shock, device longevity, device check, no reprogramming of device remotely, blue tooth interaction and device, indications for and types of medications, discussion if and what medication refills needed, treatment options, risks, benefits, and imponderables. American Heart Association lifestyle changes and behavioral modification discussed. All questions answered in detail. Counseling over 50% visit regarding above. Patient appreciative of care.  Extended time of discussion regarding atrial fibrillation.      Glenis Freeman MD

## 2023-11-22 DIAGNOSIS — Z95.810 AICD (AUTOMATIC CARDIOVERTER/DEFIBRILLATOR) PRESENT: Primary | ICD-10-CM

## 2023-11-22 DIAGNOSIS — I47.29 VENTRICULAR TACHYCARDIA (PAROXYSMAL) (MULTI): ICD-10-CM

## 2024-02-26 ENCOUNTER — TELEPHONE (OUTPATIENT)
Dept: CARDIOLOGY | Facility: CLINIC | Age: 69
End: 2024-02-26
Payer: MEDICARE

## 2024-02-26 NOTE — TELEPHONE ENCOUNTER
Patient's daughter called inquiring why Dr. Freeman prescribed fludrocortisone. I explained that it was due to the recurrent falls and orthostatic hypotension. Patient's daughter stated that another physician said she should not be taking the medication because it could drop her blood pressure. Informed patient that is not how fludrocortisone works. She states that the patient is starting to fall again and they are wondering if this is a blood pressure issue. Advised patient's daughter to take patient's blood pressure if she is feeling dizzy or had a fall so we can correlate whether or not it is related.

## 2024-02-27 ENCOUNTER — DOCUMENTATION (OUTPATIENT)
Dept: CARDIAC REHAB | Facility: CLINIC | Age: 69
End: 2024-02-27
Payer: MEDICARE

## 2024-02-28 ENCOUNTER — TRANSCRIBE ORDERS (OUTPATIENT)
Dept: CARDIAC REHAB | Facility: CLINIC | Age: 69
End: 2024-02-28
Payer: MEDICARE

## 2024-02-28 DIAGNOSIS — J44.9 CHRONIC OBSTRUCTIVE PULMONARY DISEASE, UNSPECIFIED COPD TYPE (MULTI): Primary | ICD-10-CM

## 2024-03-07 ENCOUNTER — CLINICAL SUPPORT (OUTPATIENT)
Dept: CARDIAC REHAB | Facility: CLINIC | Age: 69
End: 2024-03-07
Payer: MEDICARE

## 2024-03-07 DIAGNOSIS — J44.9 CHRONIC OBSTRUCTIVE PULMONARY DISEASE, UNSPECIFIED COPD TYPE (MULTI): Primary | ICD-10-CM

## 2024-03-07 NOTE — PROGRESS NOTES
INDIVIDUAL PULMONARY TREATMENT PLAN-INITIAL ASSESSMENT     Name: Mercedez Dunham    Today's Date: 24   : 1955    Primary Provider: Mihai Polanco  MRN: [id number]    Referring Physician: Elpidio Esqueda     Diagnosis: COPD    Onset Date:        OXYGEN ASSESSMENT  SPO2 Rest: 98  SPO2 Exertion: 97%  Using supplemental O2: Yes  Liters rest: 4 lpm Liters exertion: 4 lpm  Demonstrates appropriate knowledge of O2 use: Yes  Demonstrates appropriate knowledge of O2 safety: Yes  Home O2 system: Concentrator   Portable O2: yes, pulsating  Home pulse oximeter: Yes    Initial MMRC score: 4  Discharge MMRC score:     OXYGEN PLAN   Oxygen Goals:  1. Decrease MMRC score at discharge.  2. Decrease overall dyspnea during exercise using management techniques by discharge.     Oxygen Intervention/Education:   *Titrate supplemental oxygen if needed to maintain SPO2 greater than 88%.  *Monitor SPO2 pre, during and post exercise.      PSYCHOSOCIAL ASSESSMENT  Patient reported stress level: severe: ICD shock, worked up about many things  Using stress management skills: Yes, sit and relax, take medications  HX of anxiety: Yes  HX of depression: Yes  Patient questioned regarding any new stress, depression and anxiety symptoms: Yes, long history of depression, anxiety from health issues    Family/Support System: Marcela (daughter), son, sister-in-law  Seeing mental health provider: Yes Psychiatrist     Psychosocial medications: Xanax, Trazodone, Mirtazapine, Seroquel    Initial PHQ-9 score: 19  Discharge PHQ-9 score:   Was PHQ-9 faxed to provider: No  Date faxed:     Initial CAT score: 32  Discharge CAT score:     Stages of change: Contemplation    PSYCHOSOCIAL PLAN  Psychosocial Goals:  1. Increase PHQ9 category classification while in the program.  2. Improve emotional health and gain confidence while in the program.     Psychosocial Interventions/ Education:  *Provided one on one emotional support and will facilitate peer  support within the context of other phase II patients while in the program.       OTHER CORE COMPONENTS/ RISK FACTORS ASSESSMENT  Medication compliance: good compliance  Inhaler use: Yes  Using pill box: No, uses emesis basin, Daughter assists  Carries medication list: Yes    Bronchial hygiene:  Cough: clear and yellow phlegm  Comments: Reports frequent coughing    Blood Pressure Management:  History of High BP: Yes  Resting BP: 144/86    Tobacco: FORMER  Form of tobacco: cigarettes   Quit Date:  8 years ago  Anyone in the house smoke: No    Initial Knowledge Test Score:9/15  Discharge Knowledge Test Score:    OTHER CORE COMPONENTS/ RISK FACTOR PLAN   Other Core components/Risk Factor Goals:  1. Achieve and maintain resting BP <130/80 during program, as optimal goal.   2. Gain knowledge of pulmonary disease and chronic disease management prior to discharge.    Other Components/ Risk Factors Intervention/Education:  *Will continue to monitor HR, BP, and SPO2 each session.  *Encourage review of educational materials.    NUTRITION ASSESSMENT  Diabetes: No  HgbA1c:  Date Checked:  Monitors glucose at home: No  Fasting Blood Sugar Range:  Frequency:  Hypoglycemic Episode:    Weight Management  Weight: 61  Height: 210  BMI:  39.7  Current Diet: Will eat once a day or go a day without eating.  Barriers to dietary change: Resistive to dietician    Initial Dietary Assessment Score: Pending results from dietician   Discharge Dietary Assessment Score:     NUTRITION PLAN  Nutrition Goals:   1. Improve Picture Your Plate assessment results by discharge.  2. Make lifestyle changes to diet to include healthy pulmonary options while in the program.     Nutrition Intervention/Education:   *Sent dietician Picture Your Plate assessment for scoring and recommendations.   *Encourage attendance to dietician lectures.       EXERCISE ASSESSMENT  Home Exercise?: No  Frequency:   Mode:    Initial 6 Minute Walk Test Assessment:  Distance  (meters): 97.6  FiO2: 4 lpm SPO2: 97      EXERCISE PLAN  Exercise Goals:   1. Goal of 3 METs by discharge.  2. Six minute walk test increased by 30 meters or greater at discharge.  3. Have a plan in place for continued exercise after the program by discharge.    Exercise Prescription:   Based on 6 Minute Walk Test  Frequency: 3 days per week  Duration: 30 minutes  Intensity RPE: 11-14  Target HR: Rest+ 20  MET Level Range: 1.7-2.3    Prescribed oxygen flow rate (l/m): 4 lpm  Prescribed SPO2 parameters: >88%       Modality METS Load  Duration   1 Warm Up    05:00   2 NuStep 1.7 18 love 1 L 07:30   3 Arm Ergometer 2.1 2 love  07:30   4 Recumbent Bike 2.3 10 love  07:30   5 Bands    07:30   7 Cool Down     05:00     Exercise Intervention/Education:   *Aim to progress 0.5 MET every 4-6 weeks or as tolerated.  *Incorporate resistance training for muscular endurance and strength.    Date of first exercise session: Pending Order and ITP signature    LEARNING ASSESSMENT & BARRIERS  Readiness to Learn: Willing  Barriers: None  Comments:    FALL RISK  high  Comments: Recent fall    INDIVIDUAL PATIENT GOALS  Walk further without resting to catch breath by discharge.   2.    Improve breathing and decrease dyspnea by discharge.    MEDICATIONS  [id number] MG BID, NORVASC 10 MG DAILY, POTASSIUM 20 MEQ DAILY, LASIX 20 MG DAILY, METOPROLOL  MG IN AM, METOPROLOL ER 25 MG Q HS, VENLEFAXINE  MG DAILY, FLUDROCORTISONE  0.1 MG DAILY, PROTONIX 40 MG DAILY, DIGOXIN 0.125 MG Q 48 HR., XANAX 0.25 MG TID, SYNTHROID 0.125 MG DAILY, COLACE 100 MG BID (EVERY OTHER DAY),TRAZODONE 100 MG QHS, MIRTAZAPINE 15 MG QHS, LIPITOR 40 MG QHS, QUETIAPINE 250 MG QHS, FASENRA PEN SUB Q EVERY 2 MONTHS, BREO ELLIPTA INHALER 200-25 MCG 1 PUFF DAILY, ALBUTEROL INHALER 90 MCG 2 PUFFS Q 4 HR. PRN, ALBUTEROL NEBULIZER 2.5 MG/3 ML Q 4 HR. PRN,MUCINEX 600 MG BID PRN, ZOFRAN 4 MG BID PRN    STAFF COMMENTS:   Pt arrives for orientation to pulmonary rehab  accompanied with daughter Marcela. Pt is very pleasant, good historian along with daughter. Pt no longer drives, daughter will be bringing her on Fridays. Pt's sister-in-law will be bringing her on Mondays and Wednesday. Deconditioned, reports dyspnea with dressing. Uses 4 lpm O2 via NC at all times. Portable when out and concentrator when at home. Uses ventilator during sleep.  Unable to clean her home as she desires. Daughter Marcela helps with cleaning, medications and doctor appointments. Pt had a recent fall. Has experienced frequent ICD shocks which cause her anxiety. Mds have told pt she is poor candidate for any surgeries or procedures due to current health. Pt desires to walk further and have improved dyspnea with enrollment in pulmonary rehab.

## 2024-03-07 NOTE — PROGRESS NOTES
PULMONARY ASSESSMENT    Name: Mercedez Dunham   : 1955   Diagnosis: COPD  MRN: [id number]   Onset Date:     Today's Date: 24      Respiratory  HX: COPD, Fomer smoker quit 5 years ago: PPD 4, total pack year 148, chronic hypoxic respiratory failure requiring continuous oxygen 4lpm via NC, PE, wheezing, SOB with exertion  Dyspnea: Yes  Describe: on exertion with walking, dressing  HX ANTONELLA: Yes  CPAP Use:  Trilogy ventilator at night  Family History of Lung Disease: Mother  Finger Clubbing: No  Hemoptysis: No  Tobacco Use: Smoked 4 packs per day for 50 years  Other forms of tobacco: No  Anyone in the house smoke: No  Vaccines: None  Number of colds per year: 2  Number of hospitalizations in past year: has been hospitalized 5-6 times  Trouble sleeping: difficulty falling asleep and difficulty maintaining sleep  Limitations with ADLs: Able to care for self but has dyspnea with dressing.  Once in awhile has daughter washes her hair  Others close to you affected by your health: Yes, daughter cares for her and helps her clean  Activities you would like to do that your lung problem prevents you from doing: Can't clean house well, can't drive  Exposure to asbestos/plastics/fumes/mining dust: No    Cough: with clear and yellow phlegm  Resting O2 sat: 98%  Uses O2: Continuous  Liters: 4 lpm via NC. Concentrator at home, portable for outside of home  When do you use O2?:  At all times    Lung Sounds: inspiratory and expiratory wheezes  throughout, also diminished in bilateral bases  Locations: all lobes    Neurological   Orientation: oriented to person, place, time, and general circumstances  HX: Anxiety/Depression, panic attacks, migraines  History of stroke/TIA?: None    Cardiovascular   HX: Arrythmia, Afib with RVR, Vtach, Vfib, ICD with shocks, Several shocks, recent within months. Eval for AV nod ablation- not now plan for HIS ablation and upgrade to CRT but poor candidate for any procedure, HF, syncopal  episodes, CAD - chronic occlusion, HLD, HTN.   Angina: none  Describe:  History of Heart Failure: Yes, father  EF:  55% (2019)    Devices:  ICD with plans to upgrade to CRT  HX of PAD: No  Arrythmias: atrial fibrillation, ventricular tachycardia, and ventricular fibrillation    Apical: irregular, Afib, SR on monitor with rare PVC  Heart Rate: 62  BP: 144/86  Radial pulses:  R Present 2+ L Present 1+    Skin  Skin Color: normal, no cyanosis, jaundice, pallor or bruising, pink, poor turgur  Edema:  None      Comments: Mediport on right chest due to frequent hospitalizations and poor viens    Gastrointestinal/Genitourinary  HX: Graham's esophagus, 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     Comments: Obesity, anemia, appendectomy, tonsillectomy    Psychosocial  Marital status:    Children: aMrcela, son, daughter that passed away 2 years ago of pulm disease  Lives alone:  Yes  Lives with:  Drives:  No, due to ICD shocks  Occupation:  Retired: worked at a bar, [employer]    Caretaker of family member?:  No  Do you feel safe at home?:  Yes    Caffeinated drinks per day: Hx of 10-12 pots per day. Full pot per day  Alcoholic drinks per day/week: None quit 35 years ago  HX drug or alcohol abuse: Yes, Overdosed on prescription drugs many years ago   Current use of illicit drugs: No  Marijuana use: No    Comments: No form of smoking/vapor or tobacco     Musculoskeletal  HX of injury/surgery: Fracture of fifth metatarsal of Left foot    Pain Assessment  Current pain: chronic  Location: Back pain  Description: Ache and sharp pains at times has scoliosis, hurts all over at times      Fall Risk Assessment  Assistive device:  Rollator  Needs assistance:  Yes  Afraid of falling:  Yes  Fall within the past 6 months?: Yes  Injured with fall: No  Fall risk results: high                             Alert and oriented, no focal deficits, no motor or sensory deficits.

## 2024-03-13 NOTE — PROGRESS NOTES
INITIAL PICTURE YOUR PLATE ASSESSMENT  PULMONARY REHAB    SCORES:  Vegetables & Fruit (out of 12)                 3   Breads, Grains & Cereals (out of 12)        7  Red & Processed Meat (out of 12)         6  Poultry (out of 2)                                   2  Fish & Shellfish (out of 4)                      0  Beans, Nuts & Seeds (out of 4)             0  Milk & Dairy Foods (out of 6)              2  Toppings, Oils, Seasonings & Salt (out of 20)    15  Sweets, Snacks & Restaurant Food (out of 14)    9  Beverages (out of 10)          4     Overall Score (out of 96)    48     DIAGNOSIS  Inadequate intake of fruts and vegetables.  Greater than recommended intake of high fat processed meats.    GOALS  Aim to consume at least 5 fruits and vegetables/d.   Fruit and Vegetable Tip Sheet provided.    Aim to limit intake of high fat/sodium processed meats to 1x/wk.  Red Meat Tips Sheet provided.     Patient provided dietitian phone number for any further diet related questions.

## 2024-03-25 ENCOUNTER — APPOINTMENT (OUTPATIENT)
Dept: CARDIAC REHAB | Facility: CLINIC | Age: 69
End: 2024-03-25
Payer: MEDICARE

## 2024-03-27 ENCOUNTER — APPOINTMENT (OUTPATIENT)
Dept: CARDIAC REHAB | Facility: CLINIC | Age: 69
End: 2024-03-27
Payer: MEDICARE

## 2024-03-27 NOTE — PROGRESS NOTES
INDIVIDUAL PULMONARY TREATMENT PLAN-INITIAL ASSESSMENT     Name: Mercedez Dunham    Today's Date: 24   : 1955    Primary Provider: Mihai Polanco  MRN: [id number]    Referring Physician: Elpidio Esqueda     Diagnosis: COPD    Onset Date:        OXYGEN ASSESSMENT  SPO2 Rest: 98  SPO2 Exertion: 97%  Using supplemental O2: Yes  Liters rest: 4 lpm Liters exertion: 4 lpm  Demonstrates appropriate knowledge of O2 use: Yes  Demonstrates appropriate knowledge of O2 safety: Yes  Home O2 system: Concentrator   Portable O2: yes, pulsating  Home pulse oximeter: Yes    Initial MMRC score: 4  Discharge MMRC score:     OXYGEN PLAN   Oxygen Goals:  1. Decrease MMRC score at discharge.  2. Decrease overall dyspnea during exercise using management techniques by discharge.     Oxygen Intervention/Education:   *Titrate supplemental oxygen if needed to maintain SPO2 greater than 88%.  *Monitor SPO2 pre, during and post exercise.      PSYCHOSOCIAL ASSESSMENT  Patient reported stress level: severe: ICD shock, worked up about many things  Using stress management skills: Yes, sit and relax, take medications  HX of anxiety: Yes  HX of depression: Yes  Patient questioned regarding any new stress, depression and anxiety symptoms: Yes, long history of depression, anxiety from health issues    Family/Support System: Marcela (daughter), son, sister-in-law  Seeing mental health provider: Yes Psychiatrist     Psychosocial medications: Xanax, Trazodone, Mirtazapine, Seroquel    Initial PHQ-9 score: 19  Discharge PHQ-9 score:   Was PHQ-9 faxed to provider: No  Date faxed:     Initial CAT score: 32  Discharge CAT score:     Stages of change: Contemplation    PSYCHOSOCIAL PLAN  Psychosocial Goals:  1. Increase PHQ9 category classification while in the program.  2. Improve emotional health and gain confidence while in the program.     Psychosocial Interventions/ Education:  *Provided one on one emotional support and will facilitate peer  support within the context of other phase II patients while in the program.       OTHER CORE COMPONENTS/ RISK FACTORS ASSESSMENT  Medication compliance: good compliance  Inhaler use: Yes  Using pill box: No, uses emesis basin, Daughter assists  Carries medication list: Yes    Bronchial hygiene:  Cough: clear and yellow phlegm  Comments: Reports frequent coughing    Blood Pressure Management:  History of High BP: Yes  Resting BP: 144/86    Tobacco: FORMER  Form of tobacco: cigarettes   Quit Date:  8 years ago  Anyone in the house smoke: No    Initial Knowledge Test Score:9/15  Discharge Knowledge Test Score:    OTHER CORE COMPONENTS/ RISK FACTOR PLAN   Other Core components/Risk Factor Goals:  1. Achieve and maintain resting BP <130/80 during program, as optimal goal.   2. Gain knowledge of pulmonary disease and chronic disease management prior to discharge.    Other Components/ Risk Factors Intervention/Education:  *Will continue to monitor HR, BP, and SPO2 each session.  *Encourage review of educational materials.    NUTRITION ASSESSMENT  Diabetes: No  HgbA1c:  Date Checked:  Monitors glucose at home: No  Fasting Blood Sugar Range:  Frequency:  Hypoglycemic Episode:    Weight Management  Weight: 61  Height: 210  BMI:  39.7  Current Diet: Will eat once a day or go a day without eating.  Barriers to dietary change: Resistive to dietician    Initial Dietary Assessment Score: Pending results from dietician   Discharge Dietary Assessment Score:     NUTRITION PLAN  Nutrition Goals:   1. Improve Picture Your Plate assessment results by discharge.  2. Make lifestyle changes to diet to include healthy pulmonary options while in the program.     Nutrition Intervention/Education:   *Sent dietician Picture Your Plate assessment for scoring and recommendations.   *Encourage attendance to dietician lectures.       EXERCISE ASSESSMENT  Home Exercise?: No  Frequency:   Mode:    Initial 6 Minute Walk Test Assessment:  Distance  (meters): 97.6  FiO2: 4 lpm SPO2: 97      EXERCISE PLAN  Exercise Goals:   1. Goal of 3 METs by discharge.  2. Six minute walk test increased by 30 meters or greater at discharge.  3. Have a plan in place for continued exercise after the program by discharge.    Exercise Prescription:   Based on 6 Minute Walk Test  Frequency: 3 days per week  Duration: 30 minutes  Intensity RPE: 11-14  Target HR: Rest+ 20  MET Level Range: 1.7-2.3    Prescribed oxygen flow rate (l/m): 4 lpm  Prescribed SPO2 parameters: >88%       Modality METS Load  Duration   1 Warm Up    05:00   2 NuStep 1.7 18 love 1 L 07:30   3 Arm Ergometer 2.1 2 love  07:30   4 Recumbent Bike 2.3 10 love  07:30   5 Bands    07:30   7 Cool Down     05:00     Exercise Intervention/Education:   *Aim to progress 0.5 MET every 4-6 weeks or as tolerated.  *Incorporate resistance training for muscular endurance and strength.    Date of first exercise session: Pending Order and ITP signature    LEARNING ASSESSMENT & BARRIERS  Readiness to Learn: Willing  Barriers: None  Comments:    FALL RISK  high  Comments: Recent fall    INDIVIDUAL PATIENT GOALS  Walk further without resting to catch breath by discharge.   2.    Improve breathing and decrease dyspnea by discharge.    MEDICATIONS  [id number] MG BID, NORVASC 10 MG DAILY, POTASSIUM 20 MEQ DAILY, LASIX 20 MG DAILY, METOPROLOL  MG IN AM, METOPROLOL ER 25 MG Q HS, VENLEFAXINE  MG DAILY, FLUDROCORTISONE  0.1 MG DAILY, PROTONIX 40 MG DAILY, DIGOXIN 0.125 MG Q 48 HR., XANAX 0.25 MG TID, SYNTHROID 0.125 MG DAILY, COLACE 100 MG BID (EVERY OTHER DAY),TRAZODONE 100 MG QHS, MIRTAZAPINE 15 MG QHS, LIPITOR 40 MG QHS, QUETIAPINE 250 MG QHS, FASENRA PEN SUB Q EVERY 2 MONTHS, BREO ELLIPTA INHALER 200-25 MCG 1 PUFF DAILY, ALBUTEROL INHALER 90 MCG 2 PUFFS Q 4 HR. PRN, ALBUTEROL NEBULIZER 2.5 MG/3 ML Q 4 HR. PRN,MUCINEX 600 MG BID PRN, ZOFRAN 4 MG BID PRN    STAFF COMMENTS:   Pt arrives for orientation to pulmonary rehab  accompanied with daughter Marcela. Pt is very pleasant, good historian along with daughter. Pt no longer drives, daughter will be bringing her on Fridays. Pt's sister-in-law will be bringing her on Mondays and Wednesday. Deconditioned, reports dyspnea with dressing. Uses 4 lpm O2 via NC at all times. Portable when out and concentrator when at home. Uses ventilator during sleep.  Unable to clean her home as she desires. Daughter Marcela helps with cleaning, medications and doctor appointments. Pt had a recent fall. Has experienced frequent ICD shocks which cause her anxiety. Mds have told pt she is poor candidate for any surgeries or procedures due to current health. Pt desires to walk further and have improved dyspnea with enrollment in pulmonary rehab.     UPDATE: Patient's daughter called on 3/25/2024 to let us know that patient is currently in the hospital with emergency surgery for a bowel obstruction. Per daughter, Pt. Will be in the hospital for 5-7 days. Will call is when she is ready to get started.

## 2024-04-01 ENCOUNTER — APPOINTMENT (OUTPATIENT)
Dept: CARDIAC REHAB | Facility: CLINIC | Age: 69
End: 2024-04-01
Payer: MEDICARE

## 2024-04-03 ENCOUNTER — APPOINTMENT (OUTPATIENT)
Dept: CARDIAC REHAB | Facility: CLINIC | Age: 69
End: 2024-04-03
Payer: MEDICARE

## 2024-04-05 ENCOUNTER — APPOINTMENT (OUTPATIENT)
Dept: CARDIAC REHAB | Facility: CLINIC | Age: 69
End: 2024-04-05
Payer: MEDICARE

## 2024-04-10 ENCOUNTER — HOSPITAL ENCOUNTER (OUTPATIENT)
Dept: CARDIOLOGY | Facility: HOSPITAL | Age: 69
Discharge: HOME | End: 2024-04-10
Payer: MEDICARE

## 2024-04-10 ENCOUNTER — APPOINTMENT (OUTPATIENT)
Dept: CARDIAC REHAB | Facility: CLINIC | Age: 69
End: 2024-04-10
Payer: MEDICARE

## 2024-04-10 DIAGNOSIS — I47.29 VENTRICULAR TACHYCARDIA (PAROXYSMAL) (MULTI): ICD-10-CM

## 2024-04-10 DIAGNOSIS — Z95.810 AICD (AUTOMATIC CARDIOVERTER/DEFIBRILLATOR) PRESENT: ICD-10-CM

## 2024-04-10 PROCEDURE — 93295 DEV INTERROG REMOTE 1/2/MLT: CPT | Performed by: INTERNAL MEDICINE

## 2024-04-10 PROCEDURE — 93296 REM INTERROG EVL PM/IDS: CPT

## 2024-04-12 ENCOUNTER — APPOINTMENT (OUTPATIENT)
Dept: CARDIAC REHAB | Facility: CLINIC | Age: 69
End: 2024-04-12
Payer: MEDICARE

## 2024-04-15 ENCOUNTER — APPOINTMENT (OUTPATIENT)
Dept: CARDIAC REHAB | Facility: CLINIC | Age: 69
End: 2024-04-15
Payer: MEDICARE

## 2024-04-17 ENCOUNTER — APPOINTMENT (OUTPATIENT)
Dept: CARDIAC REHAB | Facility: CLINIC | Age: 69
End: 2024-04-17
Payer: MEDICARE

## 2024-04-19 ENCOUNTER — APPOINTMENT (OUTPATIENT)
Dept: CARDIAC REHAB | Facility: CLINIC | Age: 69
End: 2024-04-19
Payer: MEDICARE

## 2024-04-22 ENCOUNTER — CLINICAL SUPPORT (OUTPATIENT)
Dept: CARDIAC REHAB | Facility: CLINIC | Age: 69
End: 2024-04-22
Payer: MEDICARE

## 2024-04-22 DIAGNOSIS — J44.9 CHRONIC OBSTRUCTIVE PULMONARY DISEASE, UNSPECIFIED COPD TYPE (MULTI): ICD-10-CM

## 2024-04-22 PROCEDURE — 94625 PHY/QHP OP PULM RHB W/O MNTR: CPT | Performed by: INTERNAL MEDICINE

## 2024-04-24 ENCOUNTER — CLINICAL SUPPORT (OUTPATIENT)
Dept: CARDIAC REHAB | Facility: CLINIC | Age: 69
End: 2024-04-24
Payer: MEDICARE

## 2024-04-24 DIAGNOSIS — J44.9 CHRONIC OBSTRUCTIVE PULMONARY DISEASE, UNSPECIFIED COPD TYPE (MULTI): ICD-10-CM

## 2024-04-24 PROCEDURE — 94625 PHY/QHP OP PULM RHB W/O MNTR: CPT

## 2024-04-26 ENCOUNTER — CLINICAL SUPPORT (OUTPATIENT)
Dept: CARDIAC REHAB | Facility: CLINIC | Age: 69
End: 2024-04-26
Payer: MEDICARE

## 2024-04-26 DIAGNOSIS — J44.9 CHRONIC OBSTRUCTIVE PULMONARY DISEASE, UNSPECIFIED COPD TYPE (MULTI): ICD-10-CM

## 2024-04-26 PROCEDURE — 94625 PHY/QHP OP PULM RHB W/O MNTR: CPT | Performed by: INTERNAL MEDICINE

## 2024-04-29 ENCOUNTER — CLINICAL SUPPORT (OUTPATIENT)
Dept: CARDIAC REHAB | Facility: CLINIC | Age: 69
End: 2024-04-29
Payer: MEDICARE

## 2024-04-29 DIAGNOSIS — J44.9 CHRONIC OBSTRUCTIVE PULMONARY DISEASE, UNSPECIFIED COPD TYPE (MULTI): ICD-10-CM

## 2024-04-29 PROCEDURE — 94625 PHY/QHP OP PULM RHB W/O MNTR: CPT | Performed by: INTERNAL MEDICINE

## 2024-05-01 ENCOUNTER — CLINICAL SUPPORT (OUTPATIENT)
Dept: CARDIAC REHAB | Facility: CLINIC | Age: 69
End: 2024-05-01
Payer: MEDICARE

## 2024-05-01 DIAGNOSIS — J44.9 CHRONIC OBSTRUCTIVE PULMONARY DISEASE, UNSPECIFIED COPD TYPE (MULTI): ICD-10-CM

## 2024-05-01 PROCEDURE — 94625 PHY/QHP OP PULM RHB W/O MNTR: CPT | Performed by: INTERNAL MEDICINE

## 2024-05-03 ENCOUNTER — CLINICAL SUPPORT (OUTPATIENT)
Dept: CARDIAC REHAB | Facility: CLINIC | Age: 69
End: 2024-05-03
Payer: MEDICARE

## 2024-05-03 DIAGNOSIS — J44.9 CHRONIC OBSTRUCTIVE PULMONARY DISEASE, UNSPECIFIED COPD TYPE (MULTI): ICD-10-CM

## 2024-05-03 PROCEDURE — 94625 PHY/QHP OP PULM RHB W/O MNTR: CPT | Performed by: INTERNAL MEDICINE

## 2024-05-06 ENCOUNTER — CLINICAL SUPPORT (OUTPATIENT)
Dept: CARDIAC REHAB | Facility: CLINIC | Age: 69
End: 2024-05-06
Payer: MEDICARE

## 2024-05-06 DIAGNOSIS — J44.9 CHRONIC OBSTRUCTIVE PULMONARY DISEASE, UNSPECIFIED COPD TYPE (MULTI): ICD-10-CM

## 2024-05-06 PROCEDURE — 94625 PHY/QHP OP PULM RHB W/O MNTR: CPT | Performed by: INTERNAL MEDICINE

## 2024-05-08 ENCOUNTER — CLINICAL SUPPORT (OUTPATIENT)
Dept: CARDIAC REHAB | Facility: CLINIC | Age: 69
End: 2024-05-08
Payer: MEDICARE

## 2024-05-08 DIAGNOSIS — J44.9 CHRONIC OBSTRUCTIVE PULMONARY DISEASE, UNSPECIFIED COPD TYPE (MULTI): ICD-10-CM

## 2024-05-08 PROCEDURE — 94625 PHY/QHP OP PULM RHB W/O MNTR: CPT | Performed by: INTERNAL MEDICINE

## 2024-05-10 ENCOUNTER — CLINICAL SUPPORT (OUTPATIENT)
Dept: CARDIAC REHAB | Facility: CLINIC | Age: 69
End: 2024-05-10
Payer: MEDICARE

## 2024-05-10 DIAGNOSIS — J44.9 CHRONIC OBSTRUCTIVE PULMONARY DISEASE, UNSPECIFIED COPD TYPE (MULTI): ICD-10-CM

## 2024-05-10 PROCEDURE — 94625 PHY/QHP OP PULM RHB W/O MNTR: CPT | Performed by: INTERNAL MEDICINE

## 2024-05-10 NOTE — PROGRESS NOTES
INDIVIDUAL PULMONARY TREATMENT PLAN-30 DAY REASSESSMENT     Name: Mercedez Dunham    Today's Date: 05/10/24   : 1955    Primary Provider: Mihai Polanco  MRN: [id number]    Referring Physician: Elpidio Esqueda     Diagnosis: COPD    Onset Date:        OXYGEN REASSESSMENT  SPO2 Rest: 100  SPO2 Exertion: 94%  Using supplemental O2: Yes  Liters rest: 4 lpm Liters exertion: 4 lpm  Demonstrates appropriate knowledge of O2 use: Yes  Demonstrates appropriate knowledge of O2 safety: Yes  Home O2 system: Concentrator   Portable O2: yes, pulsating  Home pulse oximeter: Yes    Initial MMRC score: 4  Discharge MMRC score:     OXYGEN PLAN   Oxygen Goals:  1. Decrease MMRC score to < 4 at discharge. Will reassess at discharge.  2. Decrease overall dyspnea during exercise using management techniques by discharge. Pt does well taking breaks as needed and pacing self during exercise. Instructed on nasal breathing as Pt. tends to mouth breathe.    Oxygen Intervention/Education:   *Titrate supplemental oxygen if needed to maintain SPO2 greater than 88%.  *Monitor SPO2 pre, during and post exercise.  *Education: Using Oxygen, Sleep and Sleep Apnea.      PSYCHOSOCIAL REASSESSMENT  Patient reported stress level: severe: ICD shock, worked up about many things  Using stress management skills: Yes, sit and relax, take medications  HX of anxiety: Yes  HX of depression: Yes  Patient questioned regarding any new stress, depression and anxiety symptoms: Yes, long history of depression, anxiety from health issues, but denies any new issues or symptoms at this time.    Family/Support System: Marcela (daughter), son, sister-in-law  Seeing mental health provider: Yes Psychiatrist     Psychosocial medications: Xanax, Trazodone, Mirtazapine, Seroquel    Initial PHQ-9 score: 19  Discharge PHQ-9 score:   Was PHQ-9 faxed to provider: No    Initial CAT score: 32  Discharge CAT score:     Stages of change: Contemplation    PSYCHOSOCIAL  "PLAN  Psychosocial Goals:  1. Decrease PHQ9 category classification to \"moderate or less\" while in the program. Will reassess at midway.  2. Improve emotional health and gain confidence while in the program. Pt. Interacts with staff in a positive way and asks for help if needed with equipment.    Psychosocial Interventions/ Education:  *Provided one on one emotional support and will facilitate peer support within the context of other phase II patients while in the program.   *Education: Stress Series      OTHER CORE COMPONENTS/ RISK FACTORS REASSESSMENT  Medication compliance: good compliance  Inhaler use: Yes  Using pill box: No, uses emesis basin, Daughter assists  Carries medication list: Yes    Bronchial hygiene:  Cough: clear and yellow phlegm  Comments: Reports frequent coughing    Blood Pressure Management:  History of High BP: Yes  Resting BP: 122/84    Tobacco: FORMER  Form of tobacco: cigarettes   Quit Date:  8 years ago  Anyone in the house smoke: No    Initial Knowledge Test Score:9/15  Discharge Knowledge Test Score:    OTHER CORE COMPONENTS/ RISK FACTOR PLAN   Other Core components/Risk Factor Goals:  1. Achieve and maintain resting BP <130/80 during program, as optimal goal. Will reassess at midway.  2. Gain knowledge of pulmonary disease and chronic disease management prior to discharge. Will reassess at discharge.    Other Components/ Risk Factors Intervention/Education:  *Will continue to monitor HR, BP, and SPO2 each session.  *Encourage review of educational materials.  *Education: Stroke Awareness, Hands Only CPR, Q&A, Signs and Symptoms of Infection.    NUTRITION REASSESSMENT  Diabetes: No  HgbA1c:  Date Checked:  Monitors glucose at home: No  Fasting Blood Sugar Range:  Frequency:  Hypoglycemic Episode:    Weight Management  Weight: 203 lbs.  Height: 61 inches  BMI:  39.7  Current Diet: Will eat once a day or go a day without eating.  Barriers to dietary change: Resistive to " dietician    Initial Dietary Assessment Score: 48/96  Discharge Dietary Assessment Score:     NUTRITION PLAN  Nutrition Goals:   1. Improve Picture Your Plate assessment results by discharge. Will reassess at discharge.  2. Make lifestyle changes to diet to include healthy pulmonary options while in the program. Aim to consume at least 5 fruits and vegetables a day.  Aim to limit intake of high fat/sodium processed meats to 1 time per week.    Nutrition Intervention/Education:   *Sent dietician Picture Your Plate assessment for scoring and recommendations.   *Encourage attendance to dietician lectures.       EXERCISE REASSESSMENT  Home Exercise?: No  Frequency:   Mode:    Initial 6 Minute Walk Test Assessment:  Distance (meters): 97.6  FiO2: 4 lpm SPO2: 97      EXERCISE PLAN  Exercise Goals:   1. Goal of 3 METs by discharge. Current peak of 2.3 METs.  2. Six minute walk test increased by 30 meters or greater at discharge. Will reassess at midway six minute walk test.  3. Have a plan in place for continued exercise after the program by discharge. Will reassess at discharge.    Exercise Prescription:   Based on 6 Minute Walk Test  Frequency: 3 days per week  Duration: 30 minutes  Intensity RPE: 11-14  Target HR: Rest+ 20  MET Level Range: 1.6-2.3    Prescribed oxygen flow rate (l/m): 4 lpm  Prescribed SPO2 parameters: >88%       Modality METS Load  Duration   1 Warm Up    05:00   2 Recumbent Bike 2.3 10 love  07:30   3 NuStep 1.9 24 love L 2 07:30   4 Walking 2.0 4 laps  07:30   5 Bands    07:30   7 Cool Down     05:00     Exercise Intervention/Education:   *Aim to progress 0.5 MET every 4-6 weeks or as tolerated.  *Incorporate resistance training for muscular endurance and strength.    Date of first exercise session: 4/22/2024    LEARNING ASSESSMENT & BARRIERS  Readiness to Learn: Willing  Barriers: None  Comments:    FALL RISK  high  Comments: Recent fall    INDIVIDUAL PATIENT GOALS  Walk further without resting  to catch breath by discharge. Pt is currently walking 4 laps during class during 1 interval.  Able to walk most of laps without a break.  2.    Improve breathing and decrease dyspnea by discharge.    MEDICATIONS  [id number] MG BID, NORVASC 10 MG DAILY, POTASSIUM 20 MEQ DAILY, LASIX 20 MG DAILY, METOPROLOL  MG IN AM, METOPROLOL ER 25 MG Q HS, VENLEFAXINE  MG DAILY, FLUDROCORTISONE  0.1 MG DAILY, PROTONIX 40 MG DAILY, DIGOXIN 0.125 MG Q 48 HR., XANAX 0.25 MG TID, SYNTHROID 0.125 MG DAILY, COLACE 100 MG BID (EVERY OTHER DAY),TRAZODONE 100 MG QHS, MIRTAZAPINE 15 MG QHS, LIPITOR 40 MG QHS, QUETIAPINE 250 MG QHS, FASENRA PEN SUB Q EVERY 2 MONTHS, BREO ELLIPTA INHALER 200-25 MCG 1 PUFF DAILY, ALBUTEROL INHALER 90 MCG 2 PUFFS Q 4 HR. PRN, ALBUTEROL NEBULIZER 2.5 MG/3 ML Q 4 HR. PRN,MUCINEX 600 MG BID PRN, ZOFRAN 4 MG BID PRN    STAFF COMMENTS:   Pt. Has completed 9/36 sessions of pulmonary rehab. Pt no longer drives so daughter, Marcela has been bringing her on Fridays. Pt's sister-in-law has been bringing her on Mondays and Wednesday. Pt. has been working hard during exercise and is making appropriate progress so far.  She started off coming in on and exercising on 4L NC with stable saturations noted.  We have been able to titrate her down to 3L NC during exercise with stable saturations (95-99%). Will continue to titrate oxygen down as Pt. tolerates.  Rates dyspnea as moderate with exercise. Pt. denies any discomfort with exercise.  Monitor shows SR with stable heart rate response to exercise.  Stable and improved blood pressure noted.      ____________________________________________________________________________________  Dr. Dheeraj Vallejo     Time

## 2024-05-13 ENCOUNTER — CLINICAL SUPPORT (OUTPATIENT)
Dept: CARDIAC REHAB | Facility: CLINIC | Age: 69
End: 2024-05-13
Payer: MEDICARE

## 2024-05-13 DIAGNOSIS — J44.9 CHRONIC OBSTRUCTIVE PULMONARY DISEASE, UNSPECIFIED COPD TYPE (MULTI): ICD-10-CM

## 2024-05-13 PROCEDURE — 94625 PHY/QHP OP PULM RHB W/O MNTR: CPT | Performed by: INTERNAL MEDICINE

## 2024-05-15 ENCOUNTER — CLINICAL SUPPORT (OUTPATIENT)
Dept: CARDIAC REHAB | Facility: CLINIC | Age: 69
End: 2024-05-15
Payer: MEDICARE

## 2024-05-15 DIAGNOSIS — J44.9 CHRONIC OBSTRUCTIVE PULMONARY DISEASE, UNSPECIFIED COPD TYPE (MULTI): ICD-10-CM

## 2024-05-15 PROCEDURE — 94625 PHY/QHP OP PULM RHB W/O MNTR: CPT | Performed by: INTERNAL MEDICINE

## 2024-05-17 ENCOUNTER — CLINICAL SUPPORT (OUTPATIENT)
Dept: CARDIAC REHAB | Facility: CLINIC | Age: 69
End: 2024-05-17
Payer: MEDICARE

## 2024-05-17 DIAGNOSIS — J44.9 CHRONIC OBSTRUCTIVE PULMONARY DISEASE, UNSPECIFIED COPD TYPE (MULTI): ICD-10-CM

## 2024-05-17 PROCEDURE — 94625 PHY/QHP OP PULM RHB W/O MNTR: CPT | Performed by: INTERNAL MEDICINE

## 2024-05-20 ENCOUNTER — CLINICAL SUPPORT (OUTPATIENT)
Dept: CARDIAC REHAB | Facility: CLINIC | Age: 69
End: 2024-05-20
Payer: MEDICARE

## 2024-05-20 DIAGNOSIS — J44.9 CHRONIC OBSTRUCTIVE PULMONARY DISEASE, UNSPECIFIED COPD TYPE (MULTI): ICD-10-CM

## 2024-05-20 PROCEDURE — 94625 PHY/QHP OP PULM RHB W/O MNTR: CPT | Performed by: INTERNAL MEDICINE

## 2024-05-22 ENCOUNTER — CLINICAL SUPPORT (OUTPATIENT)
Dept: CARDIAC REHAB | Facility: CLINIC | Age: 69
End: 2024-05-22
Payer: MEDICARE

## 2024-05-22 DIAGNOSIS — J44.9 CHRONIC OBSTRUCTIVE PULMONARY DISEASE, UNSPECIFIED COPD TYPE (MULTI): ICD-10-CM

## 2024-05-22 PROCEDURE — 94625 PHY/QHP OP PULM RHB W/O MNTR: CPT | Performed by: INTERNAL MEDICINE

## 2024-05-24 ENCOUNTER — CLINICAL SUPPORT (OUTPATIENT)
Dept: CARDIAC REHAB | Facility: CLINIC | Age: 69
End: 2024-05-24
Payer: MEDICARE

## 2024-05-24 DIAGNOSIS — J44.9 CHRONIC OBSTRUCTIVE PULMONARY DISEASE, UNSPECIFIED COPD TYPE (MULTI): ICD-10-CM

## 2024-05-24 PROCEDURE — 94625 PHY/QHP OP PULM RHB W/O MNTR: CPT | Performed by: INTERNAL MEDICINE

## 2024-05-29 ENCOUNTER — CLINICAL SUPPORT (OUTPATIENT)
Dept: CARDIAC REHAB | Facility: CLINIC | Age: 69
End: 2024-05-29
Payer: MEDICARE

## 2024-05-29 DIAGNOSIS — J44.9 CHRONIC OBSTRUCTIVE PULMONARY DISEASE, UNSPECIFIED COPD TYPE (MULTI): ICD-10-CM

## 2024-05-29 PROCEDURE — 94625 PHY/QHP OP PULM RHB W/O MNTR: CPT | Performed by: INTERNAL MEDICINE

## 2024-05-31 ENCOUNTER — CLINICAL SUPPORT (OUTPATIENT)
Dept: CARDIAC REHAB | Facility: CLINIC | Age: 69
End: 2024-05-31
Payer: MEDICARE

## 2024-05-31 DIAGNOSIS — J44.9 CHRONIC OBSTRUCTIVE PULMONARY DISEASE, UNSPECIFIED COPD TYPE (MULTI): ICD-10-CM

## 2024-05-31 PROCEDURE — 94625 PHY/QHP OP PULM RHB W/O MNTR: CPT | Performed by: INTERNAL MEDICINE

## 2024-06-03 ENCOUNTER — CLINICAL SUPPORT (OUTPATIENT)
Dept: CARDIAC REHAB | Facility: CLINIC | Age: 69
End: 2024-06-03
Payer: MEDICARE

## 2024-06-03 DIAGNOSIS — J44.9 CHRONIC OBSTRUCTIVE PULMONARY DISEASE, UNSPECIFIED COPD TYPE (MULTI): ICD-10-CM

## 2024-06-03 PROCEDURE — 94625 PHY/QHP OP PULM RHB W/O MNTR: CPT | Performed by: INTERNAL MEDICINE

## 2024-06-03 NOTE — PROGRESS NOTES
INDIVIDUAL PULMONARY TREATMENT PLAN-60 DAY REASSESSMENT     Name: Mercedez Dunham    Today's Date: 24   : 1955    Primary Provider: Mihai Polanco  MRN: [id number]    Referring Physician: Elpidio Esqueda     Diagnosis: COPD    Onset Date:        OXYGEN REASSESSMENT  SPO2 Rest: 99%  SPO2 Exertion: 91%  Using supplemental O2: Yes  Liters rest: 3 lpm Liters exertion: 3 lpm  Demonstrates appropriate knowledge of O2 use: Yes  Demonstrates appropriate knowledge of O2 safety: Yes  Home O2 system: Concentrator   Portable O2: yes, pulsating  Home pulse oximeter: Yes    Initial MMRC score: 4  Discharge MMRC score:     OXYGEN PLAN   Oxygen Goals:  1. Decrease MMRC score to < 4 at discharge. Will reassess at discharge.  2. Decrease overall dyspnea during exercise using management techniques by discharge. Pt does well taking breaks as needed and pacing self during exercise. Instructed on nasal breathing as Pt. tends to mouth breathe. Pt. has tolerated a decrease in her oxygen to 3L NC well. Will reassess at discharge.    Oxygen Intervention/Education:   *Titrate supplemental oxygen if needed to maintain SPO2 greater than 88%.  *Monitor SPO2 pre, during and post exercise.  *Education: Breathing Retraining.      PSYCHOSOCIAL REASSESSMENT  Patient reported stress level: severe: ICD shock, worked up about many things.  Using stress management skills: Yes, sit and relax, take medications.  HX of anxiety: Yes  HX of depression: Yes  Patient questioned regarding any new stress, depression and anxiety symptoms: Yes, long history of depression, anxiety from health issues, but denies any new issues or symptoms at this time.    Family/Support System: Marcela (daughter), son, sister-in-law.  Seeing mental health provider: Yes, Psychiatrist     Psychosocial medications: Xanax, Trazodone, Mirtazapine, Seroquel.  Pt. was taken off daytime dose of Lamictal and bedtime dose of Seroquel was decreased to 200 mg from 250 mg.    Initial  "PHQ-9 score: 19  Discharge PHQ-9 score:   Was PHQ-9 faxed to provider: No    Initial CAT score: 32  Discharge CAT score:     Stages of change: Contemplation    PSYCHOSOCIAL PLAN  Psychosocial Goals:  1. Decrease PHQ9 category classification to \"moderate or less\" while in the program. Will reassess at midway.  2. Improve emotional health and gain confidence while in the program. Pt. Interacts with staff in a positive way and asks for help if needed with equipment.    Psychosocial Interventions/ Education:  *Provided one on one emotional support and will facilitate peer support within the context of other phase II patients while in the program.       OTHER CORE COMPONENTS/ RISK FACTORS REASSESSMENT  Medication compliance: good compliance  Inhaler use: Yes  Using pill box: No, uses emesis basin, Daughter assists  Carries medication list: Yes    Bronchial hygiene:  Cough: clear and yellow phlegm  Comments: Reports frequent coughing    Blood Pressure Management:  History of High BP: Yes  Resting BP: 144/80    Tobacco: FORMER  Form of tobacco: cigarettes   Quit Date:  8 years ago  Anyone in the house smoke: No    Initial Knowledge Test Score:9/15  Discharge Knowledge Test Score:    OTHER CORE COMPONENTS/ RISK FACTOR PLAN   Other Core components/Risk Factor Goals:  1. Achieve and maintain resting BP <130/80 during program, as optimal goal. BP continues to have days where it is good and days where it is higher. Will reassess at discharge.  2. Gain knowledge of pulmonary disease and chronic disease management prior to discharge. Will reassess at discharge.    Other Components/ Risk Factors Intervention/Education:  *Will continue to monitor HR, BP, and SPO2 each session.  *Encourage review of educational materials.  *Education: Visiting the Doc/Advanced Directives, Online Resources, Session with Pharmacist.    NUTRITION REASSESSMENT  Diabetes: No  HgbA1c:  Date Checked:  Monitors glucose at home: No  Fasting Blood Sugar " Range:  Frequency:  Hypoglycemic Episode:    Weight Management  Weight: 203 lbs.  Height: 61 inches  BMI:  39.7  Current Diet: Will eat once a day or go a day without eating.  Barriers to dietary change: Resistive to dietician    Initial Dietary Assessment Score: 48/96  Discharge Dietary Assessment Score:     NUTRITION PLAN  Nutrition Goals:   1. Improve Picture Your Plate assessment results by discharge. Will reassess at discharge.  2. Make lifestyle changes to diet to include healthy pulmonary options while in the program. Aim to consume at least 5 fruits and vegetables a day.  Aim to limit intake of high fat/sodium processed meats to 1 time per week. Will reassess at discharge.    Nutrition Intervention/Education:   *Sent dietician Picture Your Plate assessment for scoring and recommendations.   *Encourage attendance to dietician lectures.   *Education: Session with Dietician.      EXERCISE REASSESSMENT  Home Exercise?: No  Frequency:   Mode:    Initial 6 Minute Walk Test Assessment:  Distance (meters): 97.6  FiO2: 4 lpm SPO2: 97      EXERCISE PLAN  Exercise Goals:   1. Goal of 3 METs by discharge. Current peak of 2.3 METs.  2. Six minute walk test increased by 30 meters or greater at discharge. Will reassess at midway six minute walk test.  3. Have a plan in place for continued exercise after the program by discharge. Will reassess at discharge.    Exercise Prescription:   Based on 6 Minute Walk Test  Frequency: 3 days per week  Duration: 30 minutes  Intensity RPE: 11-14  Target HR: Rest+ 20  MET Level Range: 1.7-2.3    Prescribed oxygen flow rate (l/m): 3 lpm  Prescribed SPO2 parameters: >88%       Modality METS Load  Duration   1 Warm Up    05:00   2 NuStep 1.7 16 love L2 07:30   3 Walking 1.9 4  07:30   4 Weights  3# handweights  07:30   5 Bands    07:30   7 Cool Down     05:00     Exercise Intervention/Education:   *Aim to progress 0.5 MET every 4-6 weeks or as tolerated.  *Incorporate resistance training  for muscular endurance and strength.  *Education: Resistance Training, Flexibility and Balance.    Date of first exercise session: 4/22/2024    LEARNING ASSESSMENT & BARRIERS  Readiness to Learn: Willing  Barriers: None  Comments:    FALL RISK  high  Comments: Recent fall    INDIVIDUAL PATIENT GOALS  Walk further without resting to catch breath by discharge. Pt is currently walking 4 laps during class during 1 interval.  Able to walk most of laps without a break.  2.    Improve breathing and decrease dyspnea by discharge.    MEDICATIONS  [id number] MG QHS, NORVASC 10 MG DAILY, POTASSIUM 20 MEQ DAILY, LASIX 20 MG DAILY, METOPROLOL  MG IN AM, METOPROLOL ER 25 MG Q HS, VENLEFAXINE  MG DAILY, FLUDROCORTISONE  0.1 MG DAILY, PROTONIX 40 MG DAILY, DIGOXIN 0.125 MG Q 48 HR., XANAX 0.25 MG TID, SYNTHROID 0.125 MG DAILY, COLACE 100 MG BID (EVERY OTHER DAY),TRAZODONE 100 MG QHS, MIRTAZAPINE 15 MG QHS, LIPITOR 40 MG QHS, QUETIAPINE 200 MG QHS, FASENRA PEN SUB Q EVERY 2 MONTHS, BREO ELLIPTA INHALER 200-25 MCG 1 PUFF DAILY, ALBUTEROL INHALER 90 MCG 2 PUFFS Q 4 HR. PRN, ALBUTEROL NEBULIZER 2.5 MG/3 ML Q 4 HR. PRN,MUCINEX 600 MG BID PRN, ZOFRAN 4 MG BID PRN    STAFF COMMENTS:   Pt. Has completed 18/36 sessions of pulmonary rehab. Pt. no longer drives so daughter, Marcela has been bringing her on Fridays. Pt.'s sister-in-law has been bringing her on Mondays and Wednesdays. Pt. has been working hard during exercise and is making appropriate progress so far.  She started off coming in on and exercising on 4L NC with very stable saturations noted.  We have been able to titrate her down to 3L NC during exercise with continued stable saturations (91-97%). Will continue to titrate oxygen down as Pt. tolerates.  Rates dyspnea as moderate with exercise. Pt. denies any discomfort with exercise. Pt. will be doing her midway walk test coming up in the next couple of sessions. Monitor shows SR with stable heart rate response to  exercise, HR 80's.  Stable and improved blood pressure noted. Pt. Is very pleasant and interacts well with staff and the other patients in her pulmonary rehab class.      ____________________________________________________________________________________  Dr. Esqueda   Date     Time

## 2024-06-05 ENCOUNTER — CLINICAL SUPPORT (OUTPATIENT)
Dept: CARDIAC REHAB | Facility: CLINIC | Age: 69
End: 2024-06-05
Payer: MEDICARE

## 2024-06-05 DIAGNOSIS — J44.9 CHRONIC OBSTRUCTIVE PULMONARY DISEASE, UNSPECIFIED COPD TYPE (MULTI): ICD-10-CM

## 2024-06-05 PROCEDURE — 94625 PHY/QHP OP PULM RHB W/O MNTR: CPT | Performed by: INTERNAL MEDICINE

## 2024-06-07 ENCOUNTER — CLINICAL SUPPORT (OUTPATIENT)
Dept: CARDIAC REHAB | Facility: CLINIC | Age: 69
End: 2024-06-07
Payer: MEDICARE

## 2024-06-07 DIAGNOSIS — J44.9 CHRONIC OBSTRUCTIVE PULMONARY DISEASE, UNSPECIFIED COPD TYPE (MULTI): ICD-10-CM

## 2024-06-07 PROCEDURE — 94625 PHY/QHP OP PULM RHB W/O MNTR: CPT | Performed by: INTERNAL MEDICINE

## 2024-06-10 ENCOUNTER — CLINICAL SUPPORT (OUTPATIENT)
Dept: CARDIAC REHAB | Facility: CLINIC | Age: 69
End: 2024-06-10
Payer: MEDICARE

## 2024-06-10 DIAGNOSIS — J44.9 CHRONIC OBSTRUCTIVE PULMONARY DISEASE, UNSPECIFIED COPD TYPE (MULTI): ICD-10-CM

## 2024-06-10 PROCEDURE — 94625 PHY/QHP OP PULM RHB W/O MNTR: CPT | Performed by: INTERNAL MEDICINE

## 2024-06-12 ENCOUNTER — CLINICAL SUPPORT (OUTPATIENT)
Dept: CARDIAC REHAB | Facility: CLINIC | Age: 69
End: 2024-06-12
Payer: MEDICARE

## 2024-06-12 DIAGNOSIS — J44.9 CHRONIC OBSTRUCTIVE PULMONARY DISEASE, UNSPECIFIED COPD TYPE (MULTI): ICD-10-CM

## 2024-06-12 PROCEDURE — 94625 PHY/QHP OP PULM RHB W/O MNTR: CPT | Performed by: INTERNAL MEDICINE

## 2024-06-14 ENCOUNTER — CLINICAL SUPPORT (OUTPATIENT)
Dept: CARDIAC REHAB | Facility: CLINIC | Age: 69
End: 2024-06-14
Payer: MEDICARE

## 2024-06-14 DIAGNOSIS — J44.9 CHRONIC OBSTRUCTIVE PULMONARY DISEASE, UNSPECIFIED COPD TYPE (MULTI): ICD-10-CM

## 2024-06-14 PROCEDURE — 94625 PHY/QHP OP PULM RHB W/O MNTR: CPT | Performed by: INTERNAL MEDICINE

## 2024-06-17 ENCOUNTER — CLINICAL SUPPORT (OUTPATIENT)
Dept: CARDIAC REHAB | Facility: CLINIC | Age: 69
End: 2024-06-17
Payer: MEDICARE

## 2024-06-17 DIAGNOSIS — J44.9 CHRONIC OBSTRUCTIVE PULMONARY DISEASE, UNSPECIFIED COPD TYPE (MULTI): ICD-10-CM

## 2024-06-17 PROCEDURE — 94625 PHY/QHP OP PULM RHB W/O MNTR: CPT | Performed by: INTERNAL MEDICINE

## 2024-06-19 ENCOUNTER — CLINICAL SUPPORT (OUTPATIENT)
Dept: CARDIAC REHAB | Facility: CLINIC | Age: 69
End: 2024-06-19
Payer: MEDICARE

## 2024-06-19 DIAGNOSIS — J44.9 CHRONIC OBSTRUCTIVE PULMONARY DISEASE, UNSPECIFIED COPD TYPE (MULTI): ICD-10-CM

## 2024-06-19 PROCEDURE — 94625 PHY/QHP OP PULM RHB W/O MNTR: CPT | Performed by: INTERNAL MEDICINE

## 2024-06-21 ENCOUNTER — CLINICAL SUPPORT (OUTPATIENT)
Dept: CARDIAC REHAB | Facility: CLINIC | Age: 69
End: 2024-06-21
Payer: MEDICARE

## 2024-06-21 DIAGNOSIS — J44.9 CHRONIC OBSTRUCTIVE PULMONARY DISEASE, UNSPECIFIED COPD TYPE (MULTI): ICD-10-CM

## 2024-06-21 PROCEDURE — 94625 PHY/QHP OP PULM RHB W/O MNTR: CPT | Performed by: INTERNAL MEDICINE

## 2024-06-24 ENCOUNTER — APPOINTMENT (OUTPATIENT)
Dept: CARDIOLOGY | Facility: CLINIC | Age: 69
End: 2024-06-24
Payer: MEDICARE

## 2024-06-24 ENCOUNTER — CLINICAL SUPPORT (OUTPATIENT)
Dept: CARDIAC REHAB | Facility: CLINIC | Age: 69
End: 2024-06-24
Payer: MEDICARE

## 2024-06-24 DIAGNOSIS — J44.9 CHRONIC OBSTRUCTIVE PULMONARY DISEASE, UNSPECIFIED COPD TYPE (MULTI): ICD-10-CM

## 2024-06-24 PROCEDURE — 94625 PHY/QHP OP PULM RHB W/O MNTR: CPT | Performed by: INTERNAL MEDICINE

## 2024-06-24 NOTE — PROGRESS NOTES
INDIVIDUAL PULMONARY TREATMENT PLAN-90 DAY REASSESSMENT     Name: Mercedez Dunham    Today's Date: 24   : 1955    Primary Provider: Mihai Polanco  MRN: [id number]    Referring Physician: Elpidio Esqueda     Diagnosis: COPD    Onset Date:        OXYGEN REASSESSMENT  SPO2 Rest: 100%  SPO2 Exertion: 93%  Using supplemental O2: Yes  Liters rest: 3 lpm Liters exertion: 3 lpm  Demonstrates appropriate knowledge of O2 use: Yes  Demonstrates appropriate knowledge of O2 safety: Yes  Home O2 system: Concentrator   Portable O2: yes, pulsating  Home pulse oximeter: Yes    Initial MMRC score: 4  Discharge MMRC score:     OXYGEN PLAN   Oxygen Goals:  1. Decrease MMRC score to < 4 at discharge. Will reassess at discharge.  2. Decrease overall dyspnea during exercise using management techniques by discharge. Pt does well taking breaks as needed and pacing self during exercise. Instructed on nasal breathing as Pt. tends to mouth breathe. Pt. has continued to tolerate a decrease in her oxygen to 3L NC well. Will reassess at discharge.    Oxygen Intervention/Education:   *Titrate supplemental oxygen if needed to maintain SPO2 greater than 88%.  *Monitor SPO2 pre, during and post exercise.  *Education: Breathing Retraining,  Transplant Team presentation, PFT.      PSYCHOSOCIAL REASSESSMENT  Patient reported stress level: severe: ICD shock, worked up about many things.  Using stress management skills: Yes, sit and relax, take medications.  HX of anxiety: Yes  HX of depression: Yes  Patient questioned regarding any new stress, depression and anxiety symptoms: Yes, long history of depression, anxiety from health issues, but denies any new issues or symptoms at this time.    Family/Support System: Marcela (daughter), son, sister-in-law.  Seeing mental health provider: Yes, Psychiatrist     Psychosocial medications: Xanax, Trazodone, Mirtazapine, Seroquel.  Pt. was taken off daytime dose of Lamictal and bedtime dose of  "Seroquel was decreased to 200 mg from 250 mg.    Initial PHQ-9 score: 19         [place] PHQ-9 score: 18  Discharge PHQ-9 score:   Was PHQ-9 faxed to provider: No    Initial CAT score: 32  Discharge CAT score:     Stages of change: Contemplation    PSYCHOSOCIAL PLAN  Psychosocial Goals:  1. Decrease PHQ9 category classification to \"moderate or less\" while in the program. Will reassess at discharge.  2. Improve emotional health and gain confidence while in the program. Pt. Interacts with staff in a positive way and asks for help if needed with equipment. Will reassess at discharge.    Psychosocial Interventions/ Education:  *Provided one on one emotional support and will facilitate peer support within the context of other phase II patients while in the program.   *Education: Emotional Aspects. Support Group, Emotional Intimacy.    OTHER CORE COMPONENTS/ RISK FACTORS REASSESSMENT  Medication compliance: good compliance  Inhaler use: Yes  Using pill box: No, uses emesis basin, Daughter assists  Carries medication list: Yes    Bronchial hygiene:  Cough: clear and yellow phlegm  Comments: Reports frequent coughing    Blood Pressure Management:  History of High BP: Yes  Resting BP: 138/80    Tobacco: FORMER  Form of tobacco: cigarettes   Quit Date:  8 years ago  Anyone in the house smoke: No    Initial Knowledge Test Score:9/15  Discharge Knowledge Test Score:    OTHER CORE COMPONENTS/ RISK FACTOR PLAN   Other Core components/Risk Factor Goals:  1. Achieve and maintain resting BP <130/80 during program, as optimal goal. BP continues to have days where it is good and days where it is higher. Will reassess at discharge.  2. Gain knowledge of pulmonary disease and chronic disease management prior to discharge. Will reassess at discharge.    Other Components/ Risk Factors Intervention/Education:  *Will continue to monitor HR, BP, and SPO2 each session.  *Encourage review of educational materials.  *Education: Anatomy and " Physiology, Diseases of the Lungs., Hydration/Temp Extremes/Environment, Energy Conservation.    NUTRITION REASSESSMENT  Diabetes: No  HgbA1c:  Date Checked:  Monitors glucose at home: No  Fasting Blood Sugar Range:  Frequency:  Hypoglycemic Episode:    Weight Management  Weight: 203 lbs.  Height: 61 inches  BMI:  39.7  Current Diet: Will eat once a day or go a day without eating.  Barriers to dietary change: Resistive to dietician    Initial Dietary Assessment Score: 48/96  Discharge Dietary Assessment Score:     NUTRITION PLAN  Nutrition Goals:   1. Improve Picture Your Plate assessment results by discharge. Will reassess at discharge.  2. Make lifestyle changes to diet to include healthy pulmonary options while in the program. Aim to consume at least 5 fruits and vegetables a day.  Aim to limit intake of high fat/sodium processed meats to 1 time per week. Will reassess at discharge.    Nutrition Intervention/Education:   *Sent dietician Picture Your Plate assessment for scoring and recommendations.   *Encourage attendance to dietician lectures.   *Education: Session with Dietician, Nutrition and Lung Disease, Healthy Eating for Pulmonary Patients, Nutrition Basics.      EXERCISE REASSESSMENT  Home Exercise?: No  Frequency:   Mode:    Initial 6 Minute Walk Test Assessment:  Distance (meters): 97.6  FiO2: 4 lpm SPO2: 97    [place] 6 Minute Walk Test Assessment:  Distance (meters): 207  FiO2: 3 lpm  SPO2: 99%      EXERCISE PLAN  Exercise Goals:   1. Goal of 3 METs by discharge. Current peak of 2.3 METs.  2. Six minute walk test increased by 30 meters or greater at discharge. Pt. increased meters from initial walk test by 109.4 meters at midway walk test.  Will reassess at discharge walk test.  3. Have a plan in place for continued exercise after the program by discharge. Will reassess at discharge.    Exercise Prescription:   Based on 6 Minute Walk Test  Frequency: 3 days per week  Duration: 30 minutes  Intensity  RPE: 11-14  Target HR: Rest+ 20  MET Level Range: 1.7-2.3    Prescribed oxygen flow rate (l/m): 3 lpm  Prescribed SPO2 parameters: >88%       Modality METS Load  Duration   1 Warm Up    05:00   2 NuStep 2.0 30 love L2 07:30   3 Weights  3# handweights  07:30   4 Recumbent Bike 2.3 10 love  07:30   5 Bands    07:30   7 Cool Down     05:00     Exercise Intervention/Education:   *Aim to progress 0.5 MET every 4-6 weeks or as tolerated.  *Incorporate resistance training for muscular endurance and strength.    Date of first exercise session: 4/22/2024    LEARNING ASSESSMENT & BARRIERS  Readiness to Learn: Willing  Barriers: None  Comments:    FALL RISK  high  Comments: Recent fall    INDIVIDUAL PATIENT GOALS  Walk further without resting to catch breath by discharge. Pt is currently walking 4 laps during class during 1 interval.  Able to walk most of laps without a break.  2.    Improve breathing and decrease dyspnea by discharge.    MEDICATIONS  [id number] MG QHS, NORVASC 10 MG DAILY, POTASSIUM 20 MEQ DAILY, LASIX 20 MG DAILY, METOPROLOL  MG IN AM, METOPROLOL ER 25 MG Q HS, VENLEFAXINE  MG DAILY, FLUDROCORTISONE  0.1 MG DAILY, PROTONIX 40 MG DAILY, DIGOXIN 0.125 MG Q 48 HR., XANAX 0.25 MG TID, SYNTHROID 0.125 MG DAILY, COLACE 100 MG BID (EVERY OTHER DAY),TRAZODONE 100 MG QHS, MIRTAZAPINE 15 MG QHS, LIPITOR 40 MG QHS, QUETIAPINE 200 MG QHS, FASENRA PEN SUB Q EVERY 2 MONTHS, BREO ELLIPTA INHALER 200-25 MCG 1 PUFF DAILY, ALBUTEROL INHALER 90 MCG 2 PUFFS Q 4 HR. PRN, ALBUTEROL NEBULIZER 2.5 MG/3 ML Q 4 HR. PRN,MUCINEX 600 MG BID PRN, ZOFRAN 4 MG BID PRN    STAFF COMMENTS:   Pt. Has completed 27/36 sessions of pulmonary rehab. Pt. no longer drives so daughter, Marcela has been bringing her on Fridays. Pt.'s sister-in-law has been bringing her on Mondays and Wednesdays. Pt. has been working hard during exercise and is making appropriate progress so far.  She started off coming in on and exercising on 4L NC with  "very stable saturations noted.  We have been able to titrate her down to 3L NC during exercise with continued stable saturations (%). Will continue to titrate oxygen down as Pt. tolerates.  Rates dyspnea as moderate with exercise. Pt. denies any discomfort with exercise. Pt. did improve significantly on her midway walk test from her initial walk test by increasing her meters from 97.6 to 207 meters. Monitor shows SR/ST with stable heart rate response to exercise, HR 80's-100. Stable and improved blood pressure noted. Pt. Is very pleasant and interacts well with staff and the other patients in her pulmonary rehab class. She states \"that she has gotten brave and walked the halls in her apartment building and even does some stretches that she has learned in class while she is sitting and watching TV.\"  Thank you for allowing us to take care of Mercedez.       ____________________________________________________________________________________  Dr. Esqueda   Date     Time  "

## 2024-06-26 ENCOUNTER — CLINICAL SUPPORT (OUTPATIENT)
Dept: CARDIAC REHAB | Facility: CLINIC | Age: 69
End: 2024-06-26
Payer: MEDICARE

## 2024-06-26 DIAGNOSIS — J44.9 CHRONIC OBSTRUCTIVE PULMONARY DISEASE, UNSPECIFIED COPD TYPE (MULTI): ICD-10-CM

## 2024-06-26 PROCEDURE — 94625 PHY/QHP OP PULM RHB W/O MNTR: CPT

## 2024-06-27 NOTE — PROGRESS NOTES
FOLLOW UP PICTURE YOUR PLATE DIET ASSESSMENT  PULMONARY REHAB    SCORES:  Vegetables & Fruit (out of 12)                 4   Breads, Grains & Cereals (out of 12)        6  Red & Processed Meat (out of 12)         12  Poultry (out of 2)                                   2  Fish & Shellfish (out of 4)                      0  Beans, Nuts & Seeds (out of 4)             0  Milk & Dairy Foods (out of 6)              1  Toppings, Oils, Seasonings & Salt (out of 20)    10  Sweets, Snacks & Restaurant Food (out of 14)    13  Beverages (out of 10)          9     Overall Score (out of 96)    57   Pre vs Post Overall Score Change:  INCREASE/DECREASE: increase from 48    GOALS  Aim to consume at least 5 fruits and vegetables/d.     Aim to limit intake of high fat/sodium processed meats to 1x/wk.     GOALS MET:  YES /NO: No  YES /NO: Yes

## 2024-06-28 ENCOUNTER — CLINICAL SUPPORT (OUTPATIENT)
Dept: CARDIAC REHAB | Facility: CLINIC | Age: 69
End: 2024-06-28
Payer: MEDICARE

## 2024-06-28 DIAGNOSIS — J44.9 CHRONIC OBSTRUCTIVE PULMONARY DISEASE, UNSPECIFIED COPD TYPE (MULTI): ICD-10-CM

## 2024-06-28 PROCEDURE — 94625 PHY/QHP OP PULM RHB W/O MNTR: CPT | Performed by: INTERNAL MEDICINE

## 2024-07-01 ENCOUNTER — CLINICAL SUPPORT (OUTPATIENT)
Dept: CARDIAC REHAB | Facility: CLINIC | Age: 69
End: 2024-07-01
Payer: MEDICARE

## 2024-07-01 DIAGNOSIS — J44.9 CHRONIC OBSTRUCTIVE PULMONARY DISEASE, UNSPECIFIED COPD TYPE (MULTI): ICD-10-CM

## 2024-07-01 PROCEDURE — 94625 PHY/QHP OP PULM RHB W/O MNTR: CPT | Performed by: INTERNAL MEDICINE

## 2024-07-03 ENCOUNTER — CLINICAL SUPPORT (OUTPATIENT)
Dept: CARDIAC REHAB | Facility: CLINIC | Age: 69
End: 2024-07-03
Payer: MEDICARE

## 2024-07-03 DIAGNOSIS — J44.9 CHRONIC OBSTRUCTIVE PULMONARY DISEASE, UNSPECIFIED COPD TYPE (MULTI): ICD-10-CM

## 2024-07-03 PROCEDURE — 94625 PHY/QHP OP PULM RHB W/O MNTR: CPT | Performed by: INTERNAL MEDICINE

## 2024-07-05 ENCOUNTER — APPOINTMENT (OUTPATIENT)
Dept: CARDIAC REHAB | Facility: CLINIC | Age: 69
End: 2024-07-05
Payer: MEDICARE

## 2024-07-08 ENCOUNTER — CLINICAL SUPPORT (OUTPATIENT)
Dept: CARDIAC REHAB | Facility: CLINIC | Age: 69
End: 2024-07-08
Payer: MEDICARE

## 2024-07-08 DIAGNOSIS — J44.9 CHRONIC OBSTRUCTIVE PULMONARY DISEASE, UNSPECIFIED COPD TYPE (MULTI): ICD-10-CM

## 2024-07-08 PROCEDURE — 94625 PHY/QHP OP PULM RHB W/O MNTR: CPT | Performed by: INTERNAL MEDICINE

## 2024-07-10 ENCOUNTER — CLINICAL SUPPORT (OUTPATIENT)
Dept: CARDIAC REHAB | Facility: CLINIC | Age: 69
End: 2024-07-10
Payer: MEDICARE

## 2024-07-10 DIAGNOSIS — J44.9 CHRONIC OBSTRUCTIVE PULMONARY DISEASE, UNSPECIFIED COPD TYPE (MULTI): ICD-10-CM

## 2024-07-10 PROCEDURE — 94625 PHY/QHP OP PULM RHB W/O MNTR: CPT | Performed by: INTERNAL MEDICINE

## 2024-07-12 ENCOUNTER — CLINICAL SUPPORT (OUTPATIENT)
Dept: CARDIAC REHAB | Facility: CLINIC | Age: 69
End: 2024-07-12
Payer: MEDICARE

## 2024-07-12 DIAGNOSIS — J44.9 CHRONIC OBSTRUCTIVE PULMONARY DISEASE, UNSPECIFIED COPD TYPE (MULTI): ICD-10-CM

## 2024-07-12 PROCEDURE — 94625 PHY/QHP OP PULM RHB W/O MNTR: CPT

## 2024-07-15 ENCOUNTER — CLINICAL SUPPORT (OUTPATIENT)
Dept: CARDIAC REHAB | Facility: CLINIC | Age: 69
End: 2024-07-15
Payer: MEDICARE

## 2024-07-15 DIAGNOSIS — J44.9 CHRONIC OBSTRUCTIVE PULMONARY DISEASE, UNSPECIFIED COPD TYPE (MULTI): ICD-10-CM

## 2024-07-15 PROCEDURE — 94625 PHY/QHP OP PULM RHB W/O MNTR: CPT | Performed by: INTERNAL MEDICINE

## 2024-07-17 ENCOUNTER — APPOINTMENT (OUTPATIENT)
Dept: CARDIAC REHAB | Facility: CLINIC | Age: 69
End: 2024-07-17
Payer: MEDICARE

## 2024-07-17 DIAGNOSIS — J44.9 CHRONIC OBSTRUCTIVE PULMONARY DISEASE, UNSPECIFIED COPD TYPE (MULTI): ICD-10-CM

## 2024-07-17 PROCEDURE — 94625 PHY/QHP OP PULM RHB W/O MNTR: CPT | Performed by: INTERNAL MEDICINE

## 2024-07-18 NOTE — PROGRESS NOTES
"INDIVIDUAL PULMONARY TREATMENT PLAN-DISCHARGE SUMMARY     Name: Mercedez Dunham    Today's Date: 24   : 1955    Primary Provider: Mihai Polanco  MRN: [id number]    Referring Physician: Elpidio Esqueda     Diagnosis: COPD    Onset Date:        OXYGEN DISCHARGE/FOLLOW-UP  SPO2 Rest: 99%  SPO2 Exertion: 86%  Using supplemental O2: Yes  Liters rest: 3 lpm Liters exertion: 3 lpm  Demonstrates appropriate knowledge of O2 use: Yes  Demonstrates appropriate knowledge of O2 safety: Yes  Home O2 system: Concentrator   Portable O2: yes, pulsating  Home pulse oximeter: Yes    Initial MMRC score: 4  Discharge MMRC score: 6    OXYGEN PLAN   Oxygen Goals:  1. Decrease MMRC score to < 4 at discharge. Goal not met.  Score increased to \"6.\"  2. Decrease overall dyspnea during exercise using management techniques by discharge. Pt does well taking breaks as needed and pacing self during exercise. Instructed on nasal breathing as Pt. tends to mouth breathe. Pt. has continued to tolerate a decrease in her oxygen to 3L NC well. Goal met.    Oxygen Intervention/Education:   *Titrate supplemental oxygen if needed to maintain SPO2 greater than 88%.  *Monitor SPO2 pre, during and post exercise.  *Education: Breathing Retraining, Diaphragm Breathing.    PSYCHOSOCIAL DISCHARGE/FOLLOW-UP  Patient reported stress level: severe: ICD shock, worked up about many things.  Using stress management skills: Yes, sit and relax, take medications.  HX of anxiety: Yes  HX of depression: Yes  Patient questioned regarding any new stress, depression and anxiety symptoms: Yes, long history of depression, anxiety from health issues, but denies any new issues or symptoms at this time.  Family/Support System: Marcela (daughter), son, sister-in-law.  Seeing mental health provider: Yes, Psychiatrist     Psychosocial medications: Xanax, Trazodone, Mirtazapine, Seroquel.  Pt. was taken off daytime dose of Lamictal and bedtime dose of Seroquel was decreased " "to 200 mg from 250 mg.    Initial PHQ-9 score: 19         [place] PHQ-9 score: 18  Discharge PHQ-9 score: 12  Was PHQ-9 faxed to provider: No    Initial CAT score: 32  Discharge CAT score: 24    Stages of change: Preparation    PSYCHOSOCIAL PLAN  Psychosocial Goals:  1. Decrease PHQ9 category classification to \"moderate or less\" while in the program. Goal met. Scored decreased to \"12 Moderate.\"  2. Improve emotional health and gain confidence while in the program. Pt. Interacts with staff in a positive way and asks for help if needed with equipment. Goal met.    Psychosocial Interventions/ Education:  *Provided one on one emotional support and will facilitate peer support within the context of other phase II patients while in the program.   *Education: Stress Series, Anxiety and Mindfulness.    OTHER CORE COMPONENTS/ RISK FACTORS DISCHARGE/FOLLOW-UP  Medication compliance: good compliance  Inhaler use: Yes  Using pill box: No, uses emesis basin, Daughter assists.  Carries medication list: Yes    Bronchial hygiene:  Cough: clear and yellow phlegm  Comments: Reports  decrease in frequent coughing.    Blood Pressure Management:  History of High BP: Yes  Resting BP: 124/66    Tobacco: FORMER  Form of tobacco: cigarettes   Quit Date:  8 years ago  Anyone in the house smoke: No    Initial Knowledge Test Score:9/15  Discharge Knowledge Test Score: 11/15    OTHER CORE COMPONENTS/ RISK FACTOR PLAN   Other Core components/Risk Factor Goals:  1. Achieve and maintain resting BP <130/80 during program, as optimal goal. BP continues to have days where it is good and days where it is higher. Goal met. Overall BP improved during the program to be on average 120's/60's.  2. Gain knowledge of pulmonary disease and chronic disease management prior to discharge. Goal met.    Test score improved to 11/15. Pt. also states \"has learned so much from this program about not only lung diseases, proper breathing and exercise, but also about " "herself.\"    Other Components/ Risk Factors Intervention/Education:  *Will continue to monitor HR, BP, and SPO2 each session.  *Encourage review of educational materials.  *Education: Medication Overview, Pharmacy, Inhaled Medications.    NUTRITION DISCHARGE/FOLLOW-UP  Diabetes: No  HgbA1c: NA  Date Checked: NA  Monitors glucose at home: No  Fasting Blood Sugar Range: NA  Frequency: NA  Hypoglycemic Episode: NA    Weight Management  Weight: 200 lbs.  Height: 61 inches  BMI:  39.7  Current Diet: Will eat once a day or go a day without eating.  Barriers to dietary change: Resistive to dietician    Initial Dietary Assessment Score: 48/96  Discharge Dietary Assessment Score: 57/96    NUTRITION PLAN  Nutrition Goals:   1. Improve Picture Your Plate assessment results by discharge. Goal met.  Score improved to 57/96 from 48/96.  2. Make lifestyle changes to diet to include healthy pulmonary options while in the program. Aim to consume at least 5 fruits and vegetables a day (Goal not met).  Aim to limit intake of high fat/sodium processed meats to 1 time per week (Goal met). Pt. States \"is definitely eating better. Even eating salads now which I never did before.  Sister in law cooks meals for a couple times a week too.\"    Nutrition Intervention/Education:   *Sent dietician Picture Your Plate assessment for scoring and recommendations.   *Encourage attendance to dietician lectures.     EXERCISE DISCHARGE/FOLLOW-UP  Home Exercise?: No  Frequency: NA  Mode: NA    Initial 6 Minute Walk Test Assessment:  Distance (meters): 97.6  FiO2: 4 lpm SPO2: 97    [place] 6 Minute Walk Test Assessment:  Distance (meters): 207  FiO2: 3 lpm  SPO2: 99%    Final 6 Minute Walk Test Assessment:  Distance (meters): 277  FiO2: 4 lpm SPO2: 86%    EXERCISE PLAN  Exercise Goals:   1. Goal of 3 METs by discharge. Goal not met. Peak of 2.3 METs.  2. Six minute walk test increased by 30 meters or greater at discharge. Pt. increased meters from initial " "walk test by 109.4 meters at midway walk test.  Pt. Increased meters from initial walk test by 179.4 meters at final walk test. Goal met.  3. Have a plan in place for continued exercise after the program by discharge. Pt. plans to continue exercise at home. States \"she will walk in her apartment building (long hallways) and outside. Her sister in law also said she will take her to the rec center with her sometimes too.\" Pt. encouraged to get some free weights for home use. Goal met.    Exercise Prescription:   Based on 6 Minute Walk Test  Frequency: 3 days per week  Duration: 30 minutes  Intensity RPE: 11-14  Target HR: Rest+ 20  MET Level Range: 1.7-2.3    Prescribed oxygen flow rate (l/m): 3 lpm  Prescribed SPO2 parameters: >88%       Modality METS Load  Duration   1 Warm Up    05:00   2 NuStep 1.9 28 [place] L2 07:30   3 NuStep 2.1 34 [place] L3 07:30   4 Weights  4 lb. load  07:30   5 Bands    07:30   7 Cool Down     05:00     Exercise Intervention/Education:   *Aim to progress 0.5 MET every 4-6 weeks or as tolerated.  *Incorporate resistance training for muscular endurance and strength.  *Education: Home Exercise, Enjoying Exercise, Benefits of Exercise.    Date of first exercise session: 4/22/2024    LEARNING ASSESSMENT & BARRIERS  Readiness to Learn: Willing  Barriers: None  Comments:    FALL RISK  high  Comments: Recent fall    INDIVIDUAL PATIENT GOALS  Walk further without resting to catch breath by discharge. Pt is currently walking 4 laps during class during 1 interval.  Able to walk most of laps without a break. Goal met.  2.    Improve breathing and decrease dyspnea by discharge. Goal met.  Pt. Improved her RPD scores from \"5\" to \"3 or 4\" on average.    MEDICATIONS  [id number] MG QHS, NORVASC 10 MG DAILY, POTASSIUM 20 MEQ DAILY, LASIX 20 MG DAILY, METOPROLOL  MG IN AM, METOPROLOL ER 25 MG Q HS, VENLEFAXINE  MG DAILY, FLUDROCORTISONE  0.1 MG DAILY, PROTONIX 40 MG DAILY, DIGOXIN 0.125 MG Q 48 " "HR., XANAX 0.25 MG TID, SYNTHROID 0.125 MG DAILY, COLACE 100 MG BID (EVERY OTHER DAY),TRAZODONE 100 MG QHS, MIRTAZAPINE 15 MG QHS, LIPITOR 40 MG QHS, QUETIAPINE 200 MG QHS, FASENRA PEN SUB Q EVERY 2 MONTHS, BREO ELLIPTA INHALER 200-25 MCG 1 PUFF DAILY, ALBUTEROL INHALER 90 MCG 2 PUFFS Q 4 HR. PRN, ALBUTEROL NEBULIZER 2.5 MG/3 ML Q 4 HR. PRN,MUCINEX 600 MG BID PRN, ZOFRAN 4 MG BID PRN    STAFF COMMENTS:   Mercedez has completed 36/36 sessions of pulmonary rehab. Pt. no longer drives so daughter, Marcela has been bringing her on Fridays. Pt.'s sister-in-law has been bringing her on Mondays and Wednesdays. Pt. has worked very hard during exercise and has made tremendous progress.  She started off coming in on and exercising on 4L NC with very stable saturations noted.  We have been able to titrate her down to 3L NC during exercise with continued stable saturations (%).  Rates dyspnea as mild to moderate with exercise. Pt. denies any discomfort with exercise. Pt. did improve significantly on her midway walk test from her initial walk test by increasing her meters from 97.6 to 207 meters. She also improved even more on her final walk test to 277 meters walked. Monitor shows SR/ST with stable heart rate response to exercise, HR 80's-100. Stable and improved blood pressure noted. Pt. is very pleasant and interacts well with staff and the other patients in her pulmonary rehab class. She states \"that this program has helped her so much to know that she can do things that she didn't think she could do.\" Mercedez plans to continue exercising at home and her sister in law also offered to take her to the [facility] center with her too.  Thank you for allowing us to take care of Mercedez and for referring her to pulmonary rehab.      ____________________________________________________________________________________  Dr. Esqueda   Date     Time  "

## 2024-07-22 ENCOUNTER — APPOINTMENT (OUTPATIENT)
Dept: CARDIOLOGY | Facility: CLINIC | Age: 69
End: 2024-07-22
Payer: MEDICARE

## 2024-07-22 VITALS
DIASTOLIC BLOOD PRESSURE: 75 MMHG | WEIGHT: 201 LBS | BODY MASS INDEX: 36.99 KG/M2 | HEART RATE: 67 BPM | HEIGHT: 62 IN | SYSTOLIC BLOOD PRESSURE: 127 MMHG

## 2024-07-22 DIAGNOSIS — I48.0 PAROXYSMAL ATRIAL FIBRILLATION WITH RVR (MULTI): Primary | ICD-10-CM

## 2024-07-22 DIAGNOSIS — Z95.810 PRESENCE OF AUTOMATIC CARDIOVERTER/DEFIBRILLATOR (AICD): ICD-10-CM

## 2024-07-22 DIAGNOSIS — Z71.2 ENCOUNTER TO DISCUSS TEST RESULTS: ICD-10-CM

## 2024-07-22 DIAGNOSIS — Z95.810 ICD (IMPLANTABLE CARDIOVERTER-DEFIBRILLATOR) IN PLACE: ICD-10-CM

## 2024-07-22 DIAGNOSIS — I49.01 VENTRICULAR FIBRILLATION (MULTI): ICD-10-CM

## 2024-07-22 DIAGNOSIS — Z71.89 MEDICATION CARE PLAN DISCUSSED WITH PATIENT: ICD-10-CM

## 2024-07-22 DIAGNOSIS — I47.29 PAROXYSMAL VENTRICULAR TACHYCARDIA (MULTI): ICD-10-CM

## 2024-07-22 DIAGNOSIS — J43.9 PULMONARY EMPHYSEMA, UNSPECIFIED EMPHYSEMA TYPE (MULTI): ICD-10-CM

## 2024-07-22 PROCEDURE — 93000 ELECTROCARDIOGRAM COMPLETE: CPT | Performed by: INTERNAL MEDICINE

## 2024-07-22 PROCEDURE — 3078F DIAST BP <80 MM HG: CPT | Performed by: INTERNAL MEDICINE

## 2024-07-22 PROCEDURE — 93282 PRGRMG EVAL IMPLANTABLE DFB: CPT | Performed by: INTERNAL MEDICINE

## 2024-07-22 PROCEDURE — 3074F SYST BP LT 130 MM HG: CPT | Performed by: INTERNAL MEDICINE

## 2024-07-22 PROCEDURE — 99214 OFFICE O/P EST MOD 30 MIN: CPT | Performed by: INTERNAL MEDICINE

## 2024-07-22 PROCEDURE — 1157F ADVNC CARE PLAN IN RCRD: CPT | Performed by: INTERNAL MEDICINE

## 2024-07-22 PROCEDURE — 1159F MED LIST DOCD IN RCRD: CPT | Performed by: INTERNAL MEDICINE

## 2024-07-22 PROCEDURE — 3008F BODY MASS INDEX DOCD: CPT | Performed by: INTERNAL MEDICINE

## 2024-07-22 PROCEDURE — 1036F TOBACCO NON-USER: CPT | Performed by: INTERNAL MEDICINE

## 2024-07-22 ASSESSMENT — ENCOUNTER SYMPTOMS
PALPITATIONS: 0
DYSPNEA ON EXERTION: 0

## 2024-07-22 NOTE — PROGRESS NOTES
"Chief Complaint:   Followup     History Of Present Illness:    Mercedez Dunham is a 69 y.o. female presenting with followup.  She is accompanied by her daughter.    Patient reports that she had good news from her pulmonary evaluation.  Her lung volumes have increased per daughter's report.    Patient denies any arrhythmia symptoms of palpitation, lightheadedness, near syncope, or syncope.           Last Recorded Vitals:  Vitals:    07/22/24 1601   BP: 127/75   BP Location: Left arm   Patient Position: Sitting   Pulse: 67   Weight: 91.2 kg (201 lb)   Height: 1.575 m (5' 2\")       Past Medical History:  See List    Past Surgical History:  See List      Social History:  She reports that she has quit smoking. Her smoking use included cigarettes. She has never used smokeless tobacco. She reports that she does not drink alcohol and does not use drugs.    Family History:  Family History   Problem Relation Name Age of Onset    Cancer Mother      Breast cancer Mother      Other (cardiac disorder) Father      Cancer Father      Stomach cancer Father      Heart attack Father      Other (cardiac pacemaker) Sister      Diabetes Sister      Stroke Maternal Cousin      Heart failure Maternal Cousin      Stomach cancer Maternal Cousin      Heart attack Maternal Cousin          Allergies:  Carbidopa, Levodopa, Sertraline, Umeclidinium, Amoxicillin, Levorphanol, Lisinopril, and Penicillins    Outpatient Medications:  Current Outpatient Medications   Medication Instructions    acetaminophen (TYLENOL) 500 mg, oral, Every 8 hours PRN,  for pain.    albuterol 90 mcg/actuation inhaler 2 puffs, inhalation, Every 4 hours PRN    albuterol 2.5 mg, inhalation, Every 4 hours PRN    ALPRAZolam (XANAX) 0.25 mg, oral, 3 times daily PRN    amitriptyline (Elavil) 50 mg tablet 2 tablets, oral, Nightly    amLODIPine (NORVASC) 10 mg, oral, Daily    atorvastatin (Lipitor) 40 mg tablet 1 tablet, oral, Nightly    Breo Ellipta 200-25 mcg/dose inhaler 1 puff, " inhalation, Daily    clotrimazole (MYCELEX) 10 mg, oral, 3 times daily, Let dissolve in the mouth gradually    digoxin (Lanoxin) 125 MCG tablet 1 tablet, oral, Every other day    docusate sodium (COLACE) 100 mg, oral, 2 times daily    doxazosin (Cardura) 8 mg tablet 1 tablet, oral, Nightly    Fasenra Pen 30 mg/mL injection subcutaneous    fludrocortisone (FLORINEF) 0.1 mg, oral, Daily    fluticasone propion-salmeteroL (Advair Diskus) 500-50 mcg/dose diskus inhaler 1 puff, inhalation, 2 times daily    furosemide (LASIX) 40 mg, oral, Daily PRN,  for weight gain > 1 lb    guaiFENesin (MUCINEX) 600 mg, oral, Every 12 hours    ipratropium (Atrovent HFA) 17 mcg/actuation inhaler 2 puffs, inhalation, 2 times daily    irbesartan (AVAPRO) 300 mg, oral, Daily    lamoTRIgine (LaMICtal) 100 mg tablet 1 tablet, oral, Daily    lamoTRIgine (LAMICTAL) 25 mg, oral, 2 times daily    levothyroxine (Synthroid, Levoxyl) 125 mcg tablet 1 tablet, oral, Daily before breakfast    levothyroxine (SYNTHROID, LEVOXYL) 112 mcg, oral, Daily before breakfast, (30 minutes before breakfast)    metoprolol succinate XL (TOPROL-XL) 100 mg, oral, Daily, In addition to 25 mg - total 125 mg    metoprolol succinate XL (TOPROL-XL) 25 mg, oral, Every evening, IN ADDITION  MG DAILY    mirtazapine (Remeron) 15 mg tablet 1 tablet, oral, Nightly    multivitamin,tx-iron-minerals tablet 1 each, oral, Daily    ondansetron (ZOFRAN) 4 mg, oral, 2 times daily PRN    pantoprazole (PROTONIX) 40 mg, oral, Every morning, AT 6 AM    potassium chloride CR 20 mEq ER tablet 20 mEq, oral, Daily    QUEtiapine XR (SEROQUEL XR) 200 mg, oral, Nightly, Take a total of 250mg at bed time ( 200mg + 50mg tabs)<BR>    QUEtiapine XR (SEROQUEL XR) 50 mg, oral, Nightly, Take a total of 250mg at bed time ( 200mg + 50mg tabs)<BR>    QUEtiapine XR (SEROQUEL XR) 150 mg, oral, Every evening    SUMAtriptan (Imitrex) 50 mg tablet 1 tablet, oral, As needed    traZODone (DESYREL) 100 mg,  oral, Daily PRN    venlafaxine XR (EFFEXOR-XR) 150 mg, oral, Daily   Review of Systems   Cardiovascular:  Negative for chest pain, dyspnea on exertion and palpitations.   All other systems reviewed and are negative.        Physical Exam:  Constitutional:       General: Awake.      Appearance: Normal and healthy appearance. Well-developed and not in distress.   Neck:      Vascular: No JVR. JVD normal.   Pulmonary:      Effort: Pulmonary effort is normal.      Breath sounds: Normal breath sounds. No wheezing. No rhonchi. No rales.   Chest:      Chest wall: Not tender to palpatation.      Comments: Normal left sided ICD site.  Cardiovascular:      PMI at left midclavicular line. Normal rate. Regular rhythm. Normal S1. Normal S2.       Murmurs: There is no murmur.      No gallop.  No click. No rub.   Pulses:     Intact distal pulses.   Edema:     Peripheral edema absent.   Abdominal:      Tenderness: There is no abdominal tenderness.   Musculoskeletal: Normal range of motion.         General: No tenderness. Skin:     General: Skin is warm and dry.   Neurological:      General: No focal deficit present.      Mental Status: Alert and oriented to person, place and time.            Last Labs:  CBC -  Lab Results   Component Value Date    WBC 11.3 05/25/2023    HGB 8.6 (L) 05/25/2023    HCT 29.7 (L) 05/25/2023    MCV 88 05/25/2023     (H) 05/25/2023       CMP -  Lab Results   Component Value Date    CALCIUM 8.2 (L) 01/25/2022    PHOS 3.1 01/21/2022    PROT 6.7 01/19/2022    ALBUMIN 4.2 01/19/2022    AST 17 01/19/2022    ALT 12 01/19/2022    ALKPHOS 66 01/19/2022    BILITOT 0.4 01/19/2022       LIPID PANEL -   Lab Results   Component Value Date    CHOL 188 06/03/2021    TRIG 161 (H) 06/03/2021    HDL 48.1 06/03/2021    CHHDL 3.9 06/03/2021    LDLF 108 (H) 06/03/2021    VLDL 32 06/03/2021       RENAL FUNCTION PANEL -   Lab Results   Component Value Date    GLUCOSE 88 01/25/2022     01/25/2022    K 3.5 01/25/2022      01/25/2022    CO2 28 01/25/2022    ANIONGAP 11 01/25/2022    BUN 3 (L) 01/25/2022    CREATININE 0.42 (L) 01/25/2022    CALCIUM 8.2 (L) 01/25/2022    PHOS 3.1 01/21/2022    ALBUMIN 4.2 01/19/2022        Lab Results   Component Value Date    BNP 50 11/03/2021    HGBA1C 5.6 07/18/2023       Last Cardiology Tests:  ECG:  ECG 12 lead (Clinic Performed) 11/10/2023    Today.  Normal sinus rhythm.  Normal axis.  Corrected QT interval 430 ms    Device check today.  [place] 141 1-36 Q ICD.  Estimated longevity device over 2 years.  12% ventricular pacing.  0 VT.    CT chest January 2024.  No pulmonary embolism.  Likely atelectasis and parenchymal scarring of anterior mid and lower lung zones..  Minimal bibasilar atelectasis.  No pericardial effusion.  Heart size normal limits.    Lab review: I have personally reviewed the laboratory result(s) see above    Assessment/Plan   Diagnoses and all orders for this visit:  Paroxysmal atrial fibrillation with RVR (Multi)  Ventricular fibrillation (Multi)  Paroxysmal ventricular tachycardia (Multi)  ICD (implantable cardioverter-defibrillator) in place  Pulmonary emphysema, unspecified emphysema type (Multi)  Encounter to discuss test results  Medication care plan discussed with patient    Paroxysmal atrial fibrillation with episodes of rapid ventricular rate.  Previously recommended upgrade to ablation and CRT device, and she declined.  Continue same conservative therapy per her wishes.  Ventricular tachycardia. Ventricular fibrillation. Secondary prevention ICD with appropriate ATP and shocks for VT. Chronic. Stable. Reviewed meds. Continue metoprolol. Continues off amiodarone (due to chronic shortness of breath, COPD, oxygen dependence, and pt's concern about pulmonary side effects) with no sustained ventricular tachycardia since August 2022, however brief nonsustained ventricular tachycardia has been noted.  Of note, pt voluntarily withdrew her driving privileges after  Detail Level: Generalized we discussed that she had syncope and multiple shocks.   Syncope; positive EPS Aug 2018 with normal SA, AV, and His-Purkinje conduction with inducible ventricular tachycardia status post single-chamber ICD implant. Chronic stable. No recurrence of syncope after pacing and ICD.  [place] Ellipse VR . Chronic. Stable. Reviewed device check. Normal device function. Ordered device checks. Continue device check paired with EP OV. Of note, multiple adjustments with device have been performed over time. Pt had trial with increasing heart rate with pacing, which did not improve fatigue. Walk test performed in past, and appropriate increase in heart rate occurred natively. She does not have pacemaker syndrome. No indication for atrial lead at this time. Continue same parameters. If need His ablation future and if patient were to agree to his ablation and device upgrade, would consider at that time.  Of note, she declined following through with upgrade in the past after we had discussed this in the office and she had originally was agreeable.  Possible history of TIA.  No ECG documentation for review of historical atrial fibrillation by computer record. Recurrent Gi bleed. Negative endoscopies and no GI intervention was able to be performed as no bleeding site was identified. Initially anticoagulation was not administered in hospital due to anemia and GI bleed. I referred patient previously to Dr. Flores for evaluation for watchman in the past. Patient did not show up for appointment. Defer to PCP for further evaluation.  Patient declined anticoagulation.  She understands risks of anticoagulation versus no anticoagulation for atrial fibrillation.  PSVT. No inducible SVT at prior EP testing. Chronic. Stable. No clinical recurrence. No other intervention indicated presently. Reviewed meds. Continue empirical metoprolol.  Discussed refills.  Coronary artery disease. Chronic. Stable. Cath 2019 with chronic occlusion, and no acute  lesion to account for VT shocks in past.  Patient opted for conservative care. Follows with cardiology.  History of anxiety about historical shocks for VT. Continue to avoid meds that prolonged QT.   Multiple falls including hand trauma.   Orthostatic hypotension. Chronic, stable. Reviewed meds. Continue medications. Discussed refills.  Hypertension, benign. Chronic controlled. Reviewed meds. Continue meds. Refills.  History of pulmonary embolism; previously treated with Xarelto chronic. Stable.  Hyperlipidemia; chronic. Stable. On statin.   Hypothyroidism. Chronic. Stable.   COPD. On oxygen 4 L per nasal cannula. Chronic. Stable. Reviewed meds. Tolerates metoprolol. Completing prednisone course.   Declined Covid 19 vaccinations in past   History of left fifth finger fracture from fall. Chronic. Stable.   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 Stable.   Degenerative joint disease.Chronic. Stable. Reviewed meds.  Overweight.   AHA recommendations for exercise, diet, and behavioral modification reviewed with pt.     Counseling over 50% visit performed. The patient , daughter, and I discussed the mechanism of arrhythmia, a fib, defibrillator, ventricular tachycardia, previous device shock, declined his ablation and upgrade of device, patient did not show up for appointment with Dr. Flores for watchman evaluation, [id number] score reviewed, anticoagulation versus no anticoagulation, opt for conservative care, indications for and types of medications, discussion if and what medication refills needed, treatment options, risks, benefits, and imponderables. American Heart Association lifestyle changes and behavioral modification discussed. All questions answered in detail. Patient appreciative of care.         Glenis Freeman MD   Detail Level: Zone Detail Level: Detailed

## 2024-09-27 DIAGNOSIS — I10 ESSENTIAL HYPERTENSION, BENIGN: Chronic | ICD-10-CM

## 2024-09-27 DIAGNOSIS — I48.0 PAROXYSMAL ATRIAL FIBRILLATION WITH RVR (MULTI): Primary | ICD-10-CM

## 2024-09-27 RX ORDER — METOPROLOL SUCCINATE 100 MG/1
100 TABLET, EXTENDED RELEASE ORAL DAILY
Qty: 90 TABLET | Refills: 3 | Status: SHIPPED | OUTPATIENT
Start: 2024-09-27

## 2024-09-27 RX ORDER — METOPROLOL SUCCINATE 25 MG/1
25 TABLET, EXTENDED RELEASE ORAL EVERY EVENING
Qty: 90 TABLET | Refills: 3 | Status: SHIPPED | OUTPATIENT
Start: 2024-09-27

## 2024-09-27 NOTE — TELEPHONE ENCOUNTER
Patient called and requested refills on Metoprolol 25 mg and 100 mg by Dr. Glenis Freeman MD, FACC, FACP, FHRS.     Last OV 7/22/24  Next OV 7/21/25

## 2024-10-24 ENCOUNTER — HOSPITAL ENCOUNTER (OUTPATIENT)
Dept: CARDIOLOGY | Facility: HOSPITAL | Age: 69
Discharge: HOME | End: 2024-10-24
Payer: MEDICARE

## 2024-10-24 DIAGNOSIS — I49.01 VENTRICULAR FIBRILLATION (MULTI): ICD-10-CM

## 2024-10-24 PROCEDURE — 93296 REM INTERROG EVL PM/IDS: CPT

## 2024-10-24 PROCEDURE — 93295 DEV INTERROG REMOTE 1/2/MLT: CPT | Performed by: INTERNAL MEDICINE

## 2025-01-06 ENCOUNTER — APPOINTMENT (OUTPATIENT)
Dept: CARDIOLOGY | Facility: CLINIC | Age: 70
End: 2025-01-06
Payer: MEDICARE

## 2025-01-15 ENCOUNTER — HOSPITAL ENCOUNTER (OUTPATIENT)
Dept: CARDIOLOGY | Facility: HOSPITAL | Age: 70
Discharge: HOME | End: 2025-01-15
Payer: MEDICARE

## 2025-01-15 DIAGNOSIS — I49.01 VENTRICULAR FIBRILLATION (MULTI): ICD-10-CM

## 2025-01-15 PROCEDURE — 93296 REM INTERROG EVL PM/IDS: CPT

## 2025-04-22 ENCOUNTER — HOSPITAL ENCOUNTER (OUTPATIENT)
Dept: CARDIOLOGY | Facility: HOSPITAL | Age: 70
Discharge: HOME | End: 2025-04-22
Payer: MEDICARE

## 2025-04-22 DIAGNOSIS — I47.20 PAROXYSMAL VENTRICULAR TACHYCARDIA: ICD-10-CM

## 2025-04-22 DIAGNOSIS — Z95.810 ICD (IMPLANTABLE CARDIOVERTER-DEFIBRILLATOR) IN PLACE: ICD-10-CM

## 2025-04-22 PROCEDURE — 93296 REM INTERROG EVL PM/IDS: CPT

## 2025-07-21 ENCOUNTER — APPOINTMENT (OUTPATIENT)
Dept: CARDIOLOGY | Facility: CLINIC | Age: 70
End: 2025-07-21
Payer: MEDICARE

## 2025-07-21 ENCOUNTER — OFFICE VISIT (OUTPATIENT)
Dept: CARDIOLOGY | Facility: CLINIC | Age: 70
End: 2025-07-21
Payer: MEDICARE

## 2025-07-21 ENCOUNTER — ANCILLARY PROCEDURE (OUTPATIENT)
Dept: CARDIOLOGY | Facility: CLINIC | Age: 70
End: 2025-07-21
Payer: MEDICARE

## 2025-07-21 VITALS
DIASTOLIC BLOOD PRESSURE: 74 MMHG | WEIGHT: 232.2 LBS | HEART RATE: 75 BPM | SYSTOLIC BLOOD PRESSURE: 136 MMHG | OXYGEN SATURATION: 98 % | BODY MASS INDEX: 42.73 KG/M2 | HEIGHT: 62 IN

## 2025-07-21 DIAGNOSIS — I47.10 PSVT (PAROXYSMAL SUPRAVENTRICULAR TACHYCARDIA): ICD-10-CM

## 2025-07-21 DIAGNOSIS — Z87.891 FORMER SMOKER: ICD-10-CM

## 2025-07-21 DIAGNOSIS — I49.01 VENTRICULAR FIBRILLATION (MULTI): Primary | ICD-10-CM

## 2025-07-21 DIAGNOSIS — Z95.810 CARDIAC DEFIBRILLATOR IN PLACE: ICD-10-CM

## 2025-07-21 DIAGNOSIS — I50.9 CONGESTIVE HEART FAILURE OF UNKNOWN ETIOLOGY: ICD-10-CM

## 2025-07-21 DIAGNOSIS — E78.2 HYPERLIPIDEMIA, MIXED: ICD-10-CM

## 2025-07-21 DIAGNOSIS — Z71.89 ENCOUNTER TO DISCUSS TREATMENT OPTIONS: ICD-10-CM

## 2025-07-21 DIAGNOSIS — I10 PRIMARY HYPERTENSION: ICD-10-CM

## 2025-07-21 DIAGNOSIS — I50.32 CHRONIC DIASTOLIC CONGESTIVE HEART FAILURE: ICD-10-CM

## 2025-07-21 DIAGNOSIS — I49.01 VENTRICULAR FIBRILLATION (MULTI): ICD-10-CM

## 2025-07-21 DIAGNOSIS — Z71.89 ENCOUNTER FOR MEDICATION REVIEW AND COUNSELING: ICD-10-CM

## 2025-07-21 DIAGNOSIS — I47.20 PAROXYSMAL VENTRICULAR TACHYCARDIA: ICD-10-CM

## 2025-07-21 DIAGNOSIS — I48.0 PAROXYSMAL ATRIAL FIBRILLATION WITH RVR (MULTI): ICD-10-CM

## 2025-07-21 PROCEDURE — 1159F MED LIST DOCD IN RCRD: CPT | Performed by: INTERNAL MEDICINE

## 2025-07-21 PROCEDURE — 1157F ADVNC CARE PLAN IN RCRD: CPT | Performed by: INTERNAL MEDICINE

## 2025-07-21 PROCEDURE — 99215 OFFICE O/P EST HI 40 MIN: CPT | Performed by: INTERNAL MEDICINE

## 2025-07-21 PROCEDURE — 3008F BODY MASS INDEX DOCD: CPT | Performed by: INTERNAL MEDICINE

## 2025-07-21 PROCEDURE — 3075F SYST BP GE 130 - 139MM HG: CPT | Performed by: INTERNAL MEDICINE

## 2025-07-21 PROCEDURE — 93282 PRGRMG EVAL IMPLANTABLE DFB: CPT | Performed by: INTERNAL MEDICINE

## 2025-07-21 PROCEDURE — 3078F DIAST BP <80 MM HG: CPT | Performed by: INTERNAL MEDICINE

## 2025-07-21 ASSESSMENT — ENCOUNTER SYMPTOMS
DYSPNEA ON EXERTION: 1
ORTHOPNEA: 0
IRREGULAR HEARTBEAT: 0
SYNCOPE: 0
FEVER: 0
PALPITATIONS: 0
NEAR-SYNCOPE: 0
WEIGHT GAIN: 1
CHILLS: 0
DIAPHORESIS: 0
SHORTNESS OF BREATH: 1
SLEEP DISTURBANCES DUE TO BREATHING: 1

## 2025-07-21 NOTE — PATIENT INSTRUCTIONS
Continue same medications/treatment.  Patient educated on proper medication use.  Patient educated on risk factor modification.  Please bring any lab results from other providers/physicians to your next appointment.    Please bring all medicines, vitamins, and herbal supplements with you when you come to the office.    Prescriptions will not be filled unless you are compliant with your follow up appointments or have a follow up appointment scheduled as per instruction of your physician. Refills should be requested at the time of your visit.    Follow up with Dr. Freeman in 1 year with device check  Continue remote checks at 3, 6, and 9 months    WILLIE VERA RN, AM SCRIBING FOR AND IN THE PRESENCE OF DR. RODDY FREEMAN MD, FACC, FACP, RS      WILLIE VERA RN, AM SCRIBING FOR AND IN THE PRESENCE OF DR. RODDY FREEMAN MD, FACC, FACP, RS

## 2025-07-21 NOTE — PROGRESS NOTES
"  Patient ID: Mercedez Dunham is a 70 y.o. female who presents for Follow-up and Atrial Fibrillation.  History of Present Illness  Mercedez Dunham is a 70 year old female with a pacemaker who presents for a cardiovascular follow-up.  She is accompanied by her daughter.    The pacemaker, implanted in February 2018, has an estimated two years of battery life remaining. It shows 12% pacing in the ventricle as needed, with no ventricular tachycardia or other arrhythmias. She experiences a fast heart rate during activities such as showering, not exceeding 182 beats per minute. A recent CT scan revealed coronary calcifications. She confirms having a remote monitor for her pacemaker and has no current concerns or medication refill needs.    PMHx:  See list  Now uses BiPAP at night    PSHx:  See list    Social Hx:  Social History[1]    Family Hx:  Family History[2]    Review of Systems   Constitutional: Positive for weight gain. Negative for chills, diaphoresis, fever and malaise/fatigue.   Cardiovascular:  Positive for dyspnea on exertion. Negative for chest pain, irregular heartbeat, leg swelling, near-syncope, orthopnea, palpitations and syncope.   Respiratory:  Positive for shortness of breath and sleep disturbances due to breathing.    All other systems reviewed and are negative.  Lung nodule noted on CT scan.  Recommend follow-up with PCP      Objective     /74 (BP Location: Left arm, Patient Position: Sitting)   Pulse 75   Ht 1.575 m (5' 2\")   Wt 105 kg (232 lb 3.2 oz)   SpO2 98%   BMI 42.47 kg/m²        LABS:  CBC:   Lab Results   Component Value Date    WBC 11.3 05/25/2023    RBC 3.39 (L) 05/25/2023    HGB 8.6 (L) 05/25/2023    HCT 29.7 (L) 05/25/2023    MCV 88 05/25/2023    MCHC 29.0 (L) 05/25/2023    RDW 18.4 (H) 05/25/2023     (H) 05/25/2023     CBC with Differential:    Lab Results   Component Value Date    WBC 11.3 05/25/2023    RBC 3.39 (L) 05/25/2023    HGB 8.6 (L) 05/25/2023    HCT 29.7 (L) " 05/25/2023     (H) 05/25/2023    MCV 88 05/25/2023    MCHC 29.0 (L) 05/25/2023    RDW 18.4 (H) 05/25/2023    NRBC 0.0 01/25/2022    LYMPHOPCT 13.2 05/25/2023    MONOPCT 7.8 05/25/2023    EOSPCT 3.2 05/25/2023    BASOPCT 0.4 05/25/2023    MONOSABS 0.88 05/25/2023    LYMPHSABS 1.49 05/25/2023    EOSABS 0.36 05/25/2023    BASOSABS 0.05 05/25/2023     CMP:    Lab Results   Component Value Date     01/25/2022    K 3.5 01/25/2022     01/25/2022    CO2 28 01/25/2022    BUN 3 (L) 01/25/2022    CREATININE 0.42 (L) 01/25/2022    GLUCOSE 88 01/25/2022    PROT 6.7 01/19/2022    CALCIUM 8.2 (L) 01/25/2022    BILITOT 0.4 01/19/2022    ALKPHOS 66 01/19/2022    AST 17 01/19/2022    ALT 12 01/19/2022     BMP:    Lab Results   Component Value Date     01/25/2022    K 3.5 01/25/2022     01/25/2022    CO2 28 01/25/2022    BUN 3 (L) 01/25/2022    CREATININE 0.42 (L) 01/25/2022    CALCIUM 8.2 (L) 01/25/2022    GLUCOSE 88 01/25/2022     Magnesium:  Lab Results   Component Value Date    MG 1.90 01/25/2022           Physical Exam  Constitutional:       General: Awake.      Appearance: Normal and healthy appearance. Well-developed and not in distress.   Neck:      Vascular: No JVR. JVD normal.   Pulmonary:      Effort: Pulmonary effort is normal. Prolonged expiration present.      Breath sounds: Wheezing present. No rhonchi. No rales.   Chest:      Chest wall: Not tender to palpatation.      Comments: Left sided device pocket- healed and well approximated. No swelling or hematoma      Cardiovascular:      PMI at left midclavicular line. Normal rate. Regular rhythm. Normal S1. Normal S2.       Murmurs: There is no murmur.      No gallop.  No click. No rub.   Pulses:     Intact distal pulses.   Edema:     Peripheral edema absent.   Abdominal:      Tenderness: There is no abdominal tenderness.   Musculoskeletal: Normal range of motion.         General: No tenderness. Skin:     General: Skin is warm and dry.    Neurological:      General: No focal deficit present.      Mental Status: Alert and oriented to person, place and time.         Device check. See scanned.  ECG. See scanned.    Assessment & Plan  Ventricular tachycardia. Ventricular fibrillation. Secondary prevention ICD with appropriate ATP and shocks for VT. Chronic. Stable. Reviewed meds. Continue metoprolol. Continues off amiodarone (due to chronic shortness of breath, COPD, oxygen dependence, and pt's concern about pulmonary side effects) with no sustained ventricular tachycardia since August 2022, however brief nonsustained ventricular tachycardia has been noted.  Of note, pt voluntarily withdrew her driving privileges after we discussed that she had syncope and multiple shocks.   Syncope; positive EPS Aug 2018 with normal SA, AV, and His-Purkinje conduction with inducible ventricular tachycardia status post single-chamber ICD implant.  [employer] VR . Chronic. Stable. Reviewed device check. Normal device function. Ordered device checks.   No recent episodes. ICD functioning well with 12% ventricular pacing. Heart rhythm controlled.  - Continue remote monitoring at 3, 6, and 9 months.  - Schedule device clinic and EP office visit in 6 months.  - Increase frequency of remote monitoring and office visits as device replacement time approaches.    ICD Longevity  ICD in place for nearly seven years with approximately two years of battery life remaining. Device functioning well.  - Continue remote monitoring at 3, 6, and 9 months.  - Schedule device clinic and office visit in 6 months.  - Increase frequency of remote monitoring and office visits as device replacement time approaches.    Paroxysmal atrial fibrillation with episodes of rapid ventricular rate.  Previously recommended upgrade to ablation and CRT device, and she declined.      Coronary Artery Disease  Coronary calcifications noted on CT scan. No new symptoms. No catheterization needed unless  symptoms develop or ventricular rhythms observed.  - Follow up with primary care physician regarding CT scan findings.    Anemia  Anemia may contribute to shortness of breath due to reduced oxygen-carrying capacity.  - Follow up with primary care physician regarding anemia management.    Sleep Apnea  Managed with BiPAP at home. No new concerns or changes in management.    Overweight and sleep apnea  Weight gain may contribute to shortness of breath, atrial fibrillation,  and increased heart rate during activity.      Counseled greater than 50% of the visit.  The patient, her daughter, and I discussed arrhythmia, ECG, shared decision making, atrial fibrillation, sleep apnea, dysfunction, ventricular tachycardia, ablation, device clinic, eventual RODOLFO of device,, treatment options, risk, benefits, and imponderables.  Lifestyle modifications reviewed.  All questions answered.  Patient appreciative of care  Assessment & Plan  Ventricular fibrillation (Multi)    PSVT (paroxysmal supraventricular tachycardia)    Paroxysmal ventricular tachycardia    Paroxysmal atrial fibrillation with RVR (Multi)    Primary hypertension    Hyperlipidemia, mixed    Congestive heart failure of unknown etiology    Chronic diastolic congestive heart failure    Cardiac defibrillator in place    Encounter for medication review and counseling    Former smoker    Encounter to discuss treatment options        This medical note was created with the assistance of artificial intelligence (AI) for documentation purposes. The content has been reviewed and confirmed by the healthcare provider for accuracy and completeness. Patient consented to the use of audio recording and use of AI during their visit.       I, , personally performed the services described in the documentation as scribed by the nurse in my presence, and confirm it is both accurate and complete.     Total time of 41, inclusive review of serial device checks, office notes, and  discussion of device function       [1]   Social History  Socioeconomic History    Marital status:    Tobacco Use    Smoking status: Former     Types: Cigarettes    Smokeless tobacco: Never   Substance and Sexual Activity    Alcohol use: Never    Drug use: Never     Social Drivers of Health     Financial Resource Strain: Low Risk  (1/11/2024)    Received from [facility]    Overall Financial Resource Strain (CARDIA)     Difficulty of Paying Living Expenses: Not very hard   Food Insecurity: No Food Insecurity (5/14/2025)    Received from [facility]    Hunger Vital Sign     Within the past 12 months, you worried that your food would run out before you got the money to buy more.: Never true     Within the past 12 months, the food you bought just didn't last and you didn't have money to get more.: Never true   Transportation Needs: No Transportation Needs (5/14/2025)    Received from [facility]    PRAPARE - Transportation     Lack of Transportation (Medical): No     Lack of Transportation (Non-Medical): No   Physical Activity: Inactive (8/18/2023)    Received from [facility]    Exercise Vital Sign     On average, how many days per week do you engage in moderate to strenuous exercise (like a brisk walk)?: 0 days     On average, how many minutes do you engage in exercise at this level?: 0 min   Stress: Stress Concern Present (8/18/2023)    Received from [facility]    [place] [employer] - Occupational Stress Questionnaire     Feeling of Stress : Very much   Social Connections: Moderately Integrated (8/18/2023)    Received from [facility]    Social Connection and Isolation Panel     In a typical week, how many times do you talk on the phone with family, friends, or neighbors?: More than three times a week     How often do you get together with friends or relatives?: More than three times a week     How often do you attend Congregational or Shinto services?: 1 to 4  times per year     Do you belong to any clubs or organizations such as Adventism groups, unions, fraternal or athletic groups, or school groups?: Yes     How often do you attend meetings of the clubs or organizations you belong to?: More than 4 times per year     Are you , , , , never , or living with a partner?:    Housing Stability: Unknown (5/14/2025)    Received from [facility]    Housing Stability Vital Sign     In the last 12 months, was there a time when you were not able to pay the mortgage or rent on time?: No     At any time in the past 12 months, were you homeless or living in a shelter (including now)?: No   [2]   Family History  Problem Relation Name Age of Onset    Cancer Mother      Breast cancer Mother      Other (cardiac disorder) Father      Cancer Father      Stomach cancer Father      Heart attack Father      Other (cardiac pacemaker) Sister      Diabetes Sister      Stroke Maternal Cousin      Heart failure Maternal Cousin      Stomach cancer Maternal Cousin      Heart attack Maternal Cousin

## 2025-08-11 ENCOUNTER — TELEPHONE (OUTPATIENT)
Dept: CARDIOLOGY | Facility: CLINIC | Age: 70
End: 2025-08-11
Payer: MEDICARE

## 2026-07-27 ENCOUNTER — APPOINTMENT (OUTPATIENT)
Dept: CARDIOLOGY | Facility: CLINIC | Age: 71
End: 2026-07-27
Payer: MEDICARE